# Patient Record
Sex: FEMALE | Race: BLACK OR AFRICAN AMERICAN | NOT HISPANIC OR LATINO | Employment: UNEMPLOYED | URBAN - METROPOLITAN AREA
[De-identification: names, ages, dates, MRNs, and addresses within clinical notes are randomized per-mention and may not be internally consistent; named-entity substitution may affect disease eponyms.]

---

## 2023-06-27 LAB
ALANINE AMINOTRANSFERASE (SGPT) (U/L) IN SER/PLAS: 9 U/L (ref 7–45)
ALBUMIN (G/DL) IN SER/PLAS: 4 G/DL (ref 3.4–5)
ALKALINE PHOSPHATASE (U/L) IN SER/PLAS: 78 U/L (ref 33–136)
ANION GAP IN SER/PLAS: 10 MMOL/L (ref 10–20)
ASPARTATE AMINOTRANSFERASE (SGOT) (U/L) IN SER/PLAS: 15 U/L (ref 9–39)
BASOPHILS (10*3/UL) IN BLOOD BY AUTOMATED COUNT: 0.03 X10E9/L (ref 0–0.1)
BASOPHILS/100 LEUKOCYTES IN BLOOD BY AUTOMATED COUNT: 0.5 % (ref 0–2)
BILIRUBIN DIRECT (MG/DL) IN SER/PLAS: 0.1 MG/DL (ref 0–0.3)
BILIRUBIN TOTAL (MG/DL) IN SER/PLAS: 0.6 MG/DL (ref 0–1.2)
CALCIUM (MG/DL) IN SER/PLAS: 9 MG/DL (ref 8.6–10.3)
CARBON DIOXIDE, TOTAL (MMOL/L) IN SER/PLAS: 27 MMOL/L (ref 21–32)
CHLORIDE (MMOL/L) IN SER/PLAS: 106 MMOL/L (ref 98–107)
CHOLESTEROL (MG/DL) IN SER/PLAS: 114 MG/DL (ref 0–199)
CHOLESTEROL IN HDL (MG/DL) IN SER/PLAS: 50.4 MG/DL
CHOLESTEROL/HDL RATIO: 2.3
CREATININE (MG/DL) IN SER/PLAS: 1.05 MG/DL (ref 0.5–1.05)
EOSINOPHILS (10*3/UL) IN BLOOD BY AUTOMATED COUNT: 0.07 X10E9/L (ref 0–0.7)
EOSINOPHILS/100 LEUKOCYTES IN BLOOD BY AUTOMATED COUNT: 1.2 % (ref 0–6)
ERYTHROCYTE DISTRIBUTION WIDTH (RATIO) BY AUTOMATED COUNT: 12.1 % (ref 11.5–14.5)
ERYTHROCYTE MEAN CORPUSCULAR HEMOGLOBIN CONCENTRATION (G/DL) BY AUTOMATED: 31.5 G/DL (ref 32–36)
ERYTHROCYTE MEAN CORPUSCULAR VOLUME (FL) BY AUTOMATED COUNT: 96 FL (ref 80–100)
ERYTHROCYTES (10*6/UL) IN BLOOD BY AUTOMATED COUNT: 4.72 X10E12/L (ref 4–5.2)
GFR FEMALE: 61 ML/MIN/1.73M2
GLUCOSE (MG/DL) IN SER/PLAS: 86 MG/DL (ref 74–99)
HEMATOCRIT (%) IN BLOOD BY AUTOMATED COUNT: 45.1 % (ref 36–46)
HEMOGLOBIN (G/DL) IN BLOOD: 14.2 G/DL (ref 12–16)
IMMATURE GRANULOCYTES/100 LEUKOCYTES IN BLOOD BY AUTOMATED COUNT: 0.2 % (ref 0–0.9)
LDL: 46 MG/DL (ref 0–99)
LEUKOCYTES (10*3/UL) IN BLOOD BY AUTOMATED COUNT: 5.7 X10E9/L (ref 4.4–11.3)
LYMPHOCYTES (10*3/UL) IN BLOOD BY AUTOMATED COUNT: 2.86 X10E9/L (ref 1.2–4.8)
LYMPHOCYTES/100 LEUKOCYTES IN BLOOD BY AUTOMATED COUNT: 50.4 % (ref 13–44)
MAGNESIUM (MG/DL) IN SER/PLAS: 2.1 MG/DL (ref 1.6–2.4)
MONOCYTES (10*3/UL) IN BLOOD BY AUTOMATED COUNT: 0.29 X10E9/L (ref 0.1–1)
MONOCYTES/100 LEUKOCYTES IN BLOOD BY AUTOMATED COUNT: 5.1 % (ref 2–10)
NEUTROPHILS (10*3/UL) IN BLOOD BY AUTOMATED COUNT: 2.42 X10E9/L (ref 1.2–7.7)
NEUTROPHILS/100 LEUKOCYTES IN BLOOD BY AUTOMATED COUNT: 42.6 % (ref 40–80)
PLATELETS (10*3/UL) IN BLOOD AUTOMATED COUNT: 211 X10E9/L (ref 150–450)
POTASSIUM (MMOL/L) IN SER/PLAS: 3.8 MMOL/L (ref 3.5–5.3)
PROTEIN TOTAL: 7 G/DL (ref 6.4–8.2)
SODIUM (MMOL/L) IN SER/PLAS: 139 MMOL/L (ref 136–145)
THYROTROPIN (MIU/L) IN SER/PLAS BY DETECTION LIMIT <= 0.05 MIU/L: 1.62 MIU/L (ref 0.44–3.98)
TRIGLYCERIDE (MG/DL) IN SER/PLAS: 87 MG/DL (ref 0–149)
UREA NITROGEN (MG/DL) IN SER/PLAS: 6 MG/DL (ref 6–23)
VLDL: 17 MG/DL (ref 0–40)

## 2023-06-28 LAB
ESTIMATED AVERAGE GLUCOSE FOR HBA1C: 108 MG/DL
HEMOGLOBIN A1C/HEMOGLOBIN TOTAL IN BLOOD: 5.4 %

## 2023-09-06 ENCOUNTER — HOSPITAL ENCOUNTER (OUTPATIENT)
Dept: DATA CONVERSION | Facility: HOSPITAL | Age: 58
Discharge: HOME | End: 2023-09-07
Payer: COMMERCIAL

## 2023-09-06 DIAGNOSIS — R07.9 CHEST PAIN, UNSPECIFIED: ICD-10-CM

## 2023-09-06 DIAGNOSIS — J20.9 ACUTE BRONCHITIS, UNSPECIFIED: ICD-10-CM

## 2023-09-06 DIAGNOSIS — R05.9 COUGH, UNSPECIFIED: ICD-10-CM

## 2023-09-06 DIAGNOSIS — R10.84 GENERALIZED ABDOMINAL PAIN: ICD-10-CM

## 2023-09-06 LAB
ANION GAP SERPL CALCULATED.3IONS-SCNC: 10 MMOL/L (ref 0–19)
BACTERIA SPEC CULT: NORMAL
BACTERIA UR QL AUTO: NEGATIVE
BASOPHILS # BLD AUTO: 0.02 K/UL (ref 0–0.22)
BASOPHILS NFR BLD AUTO: 0.3 % (ref 0–1)
BILIRUB UR QL STRIP.AUTO: NEGATIVE
BUN SERPL-MCNC: 9 MG/DL (ref 8–25)
BUN/CREAT SERPL: 9 RATIO (ref 8–21)
CALCIUM SERPL-MCNC: 9.1 MG/DL (ref 8.5–10.4)
CC # UR: NORMAL /UL
CHLORIDE SERPL-SCNC: 105 MMOL/L (ref 97–107)
CLARITY UR: CLEAR
CO2 SERPL-SCNC: 26 MMOL/L (ref 24–31)
COLOR UR: ABNORMAL
CREAT SERPL-MCNC: 1 MG/DL (ref 0.4–1.6)
DEPRECATED RDW RBC AUTO: 43 FL (ref 37–54)
DIFFERENTIAL METHOD BLD: ABNORMAL
EOSINOPHIL # BLD AUTO: 0.12 K/UL (ref 0–0.45)
EOSINOPHIL NFR BLD: 1.7 % (ref 0–3)
ERYTHROCYTE [DISTWIDTH] IN BLOOD BY AUTOMATED COUNT: 12.6 % (ref 11.7–15)
GFR SERPL CREATININE-BSD FRML MDRD: 64 ML/MIN/1.73 M2
GLUCOSE SERPL-MCNC: 109 MG/DL (ref 65–99)
GLUCOSE UR STRIP.AUTO-MCNC: NEGATIVE MG/DL
HCT VFR BLD AUTO: 42.9 % (ref 36–44)
HGB BLD-MCNC: 13.9 GM/DL (ref 12–15)
HGB UR QL STRIP.AUTO: ABNORMAL /HPF (ref 0–3)
HGB UR QL: NEGATIVE
HS TROPONIN T DELTA: NORMAL (ref 0–4)
HYALINE CASTS UR QL AUTO: 3 /LPF
IMM GRANULOCYTES # BLD AUTO: 0.02 K/UL (ref 0–0.1)
KETONES UR QL STRIP.AUTO: NEGATIVE
LEUKOCYTE ESTERASE UR QL STRIP.AUTO: ABNORMAL
LYMPHOCYTES # BLD AUTO: 3.73 K/UL (ref 1.2–3.2)
LYMPHOCYTES NFR BLD MANUAL: 52.5 % (ref 20–40)
MCH RBC QN AUTO: 30.1 PG (ref 26–34)
MCHC RBC AUTO-ENTMCNC: 32.4 % (ref 31–37)
MCV RBC AUTO: 92.9 FL (ref 80–100)
MICROSCOPIC (UA): ABNORMAL
MONOCYTES # BLD AUTO: 0.35 K/UL (ref 0–0.8)
MONOCYTES NFR BLD MANUAL: 4.9 % (ref 0–8)
NEUTROPHILS # BLD AUTO: 2.87 K/UL
NEUTROPHILS # BLD AUTO: 2.87 K/UL (ref 1.8–7.7)
NEUTROPHILS.IMMATURE NFR BLD: 0.3 % (ref 0–1)
NEUTS SEG NFR BLD: 40.3 % (ref 50–70)
NITRITE UR QL STRIP.AUTO: NEGATIVE
NRBC BLD-RTO: 0 /100 WBC
PH UR STRIP.AUTO: 5.5 [PH] (ref 4.6–8)
PLATELET # BLD AUTO: 199 K/UL (ref 150–450)
PMV BLD AUTO: 9.1 CU (ref 7–12.6)
POTASSIUM SERPL-SCNC: 3.9 MMOL/L (ref 3.4–5.1)
PROT UR STRIP.AUTO-MCNC: NEGATIVE MG/DL
RBC # BLD AUTO: 4.62 M/UL (ref 4–4.9)
REPORT STATUS -LH SQ DATA CONVERSION: NORMAL
SERVICE CMNT-IMP: NORMAL
SODIUM SERPL-SCNC: 141 MMOL/L (ref 133–145)
SP GR UR STRIP.AUTO: 1.01 (ref 1–1.03)
SPECIMEN SOURCE: NORMAL
SQUAMOUS UR QL AUTO: ABNORMAL /HPF
TROPONIN T SERPL-MCNC: 6 NG/L
URINE CULTURE: ABNORMAL
UROBILINOGEN UR QL STRIP.AUTO: NORMAL MG/DL (ref 0–1)
WBC # BLD AUTO: 7.1 K/UL (ref 4.5–11)
WBC #/AREA URNS AUTO: 6 /HPF (ref 0–3)

## 2023-09-07 LAB
ALBUMIN SERPL-MCNC: 3.6 GM/DL (ref 3.5–5)
ALBUMIN/GLOB SERPL: 1.5 RATIO (ref 1.5–3)
ALP BLD-CCNC: 71 U/L (ref 35–125)
ALT SERPL-CCNC: 7 U/L (ref 5–40)
AST SERPL-CCNC: 12 U/L (ref 5–40)
BILIRUB DIRECT SERPL-MCNC: 0.2 MG/DL (ref 0–0.2)
BILIRUB INDIRECT SERPL-MCNC: 0 MG/DL (ref 0–0.8)
BILIRUB SERPL-MCNC: 0.2 MG/DL (ref 0.1–1.2)
GLOBULIN SER-MCNC: 2.4 G/DL (ref 1.9–3.7)
HS TROPONIN T DELTA: 1 (ref 0–4)
PROT SERPL-MCNC: 6 G/DL (ref 5.9–7.9)
TROPONIN T SERPL-MCNC: 7 NG/L

## 2023-09-19 ENCOUNTER — HOSPITAL ENCOUNTER (OUTPATIENT)
Dept: DATA CONVERSION | Facility: HOSPITAL | Age: 58
Discharge: HOME | End: 2023-09-19
Payer: COMMERCIAL

## 2023-09-29 LAB
CHLAMYDIA TRACH., AMPLIFIED: NEGATIVE
N. GONORRHEA, AMPLIFIED: NEGATIVE
TRICHOMONAS VAGINALIS: NEGATIVE

## 2024-03-21 ENCOUNTER — OFFICE VISIT (OUTPATIENT)
Dept: OBSTETRICS AND GYNECOLOGY | Facility: CLINIC | Age: 59
End: 2024-03-21
Payer: COMMERCIAL

## 2024-03-21 VITALS
DIASTOLIC BLOOD PRESSURE: 68 MMHG | WEIGHT: 140 LBS | HEIGHT: 65 IN | BODY MASS INDEX: 23.32 KG/M2 | SYSTOLIC BLOOD PRESSURE: 128 MMHG

## 2024-03-21 DIAGNOSIS — N89.8 VAGINAL DISCHARGE: Primary | ICD-10-CM

## 2024-03-21 LAB
BACTERIAL VAGINOSIS VAG-IMP: NORMAL
CLUE CELLS VAG LPF-#/AREA: NORMAL /[LPF]
NUGENT SCORE: 4
YEAST VAG WET PREP-#/AREA: NORMAL

## 2024-03-21 PROCEDURE — 99214 OFFICE O/P EST MOD 30 MIN: CPT | Performed by: OBSTETRICS & GYNECOLOGY

## 2024-03-21 PROCEDURE — 87800 DETECT AGNT MULT DNA DIREC: CPT

## 2024-03-21 PROCEDURE — 87205 SMEAR GRAM STAIN: CPT

## 2024-03-21 RX ORDER — CETIRIZINE HYDROCHLORIDE 10 MG/1
1 TABLET ORAL DAILY
COMMUNITY
Start: 2018-03-28

## 2024-03-21 RX ORDER — LORATADINE 10 MG/1
10 TABLET ORAL DAILY
COMMUNITY
Start: 2023-12-06 | End: 2024-01-05 | Stop reason: WASHOUT

## 2024-03-21 RX ORDER — FLUTICASONE PROPIONATE 110 UG/1
AEROSOL, METERED RESPIRATORY (INHALATION)
COMMUNITY
Start: 2016-06-17

## 2024-03-21 RX ORDER — TIOTROPIUM BROMIDE INHALATION SPRAY 1.56 UG/1
2 SPRAY, METERED RESPIRATORY (INHALATION) DAILY
COMMUNITY
Start: 2019-04-30

## 2024-03-21 RX ORDER — TALC
POWDER (GRAM) TOPICAL
COMMUNITY

## 2024-03-21 RX ORDER — MONTELUKAST SODIUM 10 MG/1
1 TABLET ORAL NIGHTLY
COMMUNITY
Start: 2022-06-15

## 2024-03-21 RX ORDER — PANTOPRAZOLE SODIUM 40 MG/1
TABLET, DELAYED RELEASE ORAL
COMMUNITY
Start: 2019-12-01

## 2024-03-21 RX ORDER — ACETAMINOPHEN 500 MG
1 TABLET ORAL DAILY
COMMUNITY
Start: 2017-12-20

## 2024-03-21 RX ORDER — BUPROPION HYDROCHLORIDE 150 MG/1
TABLET ORAL
COMMUNITY
Start: 2019-10-10

## 2024-03-21 RX ORDER — AMLODIPINE BESYLATE 2.5 MG/1
TABLET ORAL
COMMUNITY
Start: 2024-03-17

## 2024-03-21 RX ORDER — NITROGLYCERIN 0.4 MG/1
TABLET SUBLINGUAL
COMMUNITY
Start: 2019-10-14

## 2024-03-21 RX ORDER — CITALOPRAM 10 MG/1
TABLET ORAL
COMMUNITY
Start: 2020-01-07

## 2024-03-21 RX ORDER — MULTIVITAMIN
1 TABLET ORAL DAILY
COMMUNITY
Start: 2024-03-09 | End: 2024-04-08

## 2024-03-21 RX ORDER — ALBUTEROL SULFATE 0.83 MG/ML
SOLUTION RESPIRATORY (INHALATION)
COMMUNITY

## 2024-03-21 RX ORDER — TRAZODONE HYDROCHLORIDE 50 MG/1
TABLET ORAL
COMMUNITY
Start: 2024-01-18

## 2024-03-21 RX ORDER — ALBUTEROL SULFATE 90 UG/1
1 AEROSOL, METERED RESPIRATORY (INHALATION) EVERY 4 HOURS PRN
COMMUNITY

## 2024-03-21 RX ORDER — VERAPAMIL HYDROCHLORIDE 180 MG/1
1 TABLET, FILM COATED, EXTENDED RELEASE ORAL DAILY
COMMUNITY
Start: 2017-12-19

## 2024-03-21 RX ORDER — FLUTICASONE PROPIONATE 50 MCG
2 SPRAY, SUSPENSION (ML) NASAL 2 TIMES DAILY
COMMUNITY
Start: 2017-04-11

## 2024-03-21 RX ORDER — ISOSORBIDE MONONITRATE 10 MG/1
TABLET ORAL
COMMUNITY

## 2024-03-21 RX ORDER — MAGNESIUM HYDROXIDE 400 MG/5ML
SUSPENSION, ORAL (FINAL DOSE FORM) ORAL
COMMUNITY

## 2024-03-21 RX ORDER — CLINDAMYCIN PHOSPHATE 1 G/10ML
GEL TOPICAL
COMMUNITY
Start: 2024-02-20 | End: 2024-04-24

## 2024-03-21 RX ORDER — ATORVASTATIN CALCIUM 40 MG/1
40 TABLET, FILM COATED ORAL DAILY
COMMUNITY
Start: 2024-02-17 | End: 2024-03-18 | Stop reason: WASHOUT

## 2024-03-21 RX ORDER — ESTRADIOL 0.1 MG/G
CREAM VAGINAL
COMMUNITY
Start: 2023-09-27

## 2024-03-21 RX ORDER — BUDESONIDE AND FORMOTEROL FUMARATE DIHYDRATE 160; 4.5 UG/1; UG/1
AEROSOL RESPIRATORY (INHALATION)
COMMUNITY

## 2024-03-21 RX ORDER — ASPIRIN 81 MG/1
1 TABLET ORAL DAILY
COMMUNITY
Start: 2017-09-13

## 2024-03-21 RX ORDER — FLUTICASONE FUROATE 27.5 UG/1
1 SPRAY, METERED NASAL DAILY
COMMUNITY
Start: 2019-04-30

## 2024-03-21 NOTE — PROGRESS NOTES
Kim Anguiano is a 59 y.o. female who presents with a chief complaint of Vaginal Discharge (Flow like discharge/No odor and no color )      SUBJECTIVE  Patient presents complaining of a vaginal discharge.  Has no odor or itching.  It is little more copious than she is used to.  She has not tried anything over-the-counter for it.    Past Medical History:   Diagnosis Date    Personal history of other diseases of the circulatory system     History of hypertension    Personal history of other diseases of the nervous system and sense organs     History of obstructive sleep apnea    Personal history of other diseases of the respiratory system     History of chronic obstructive lung disease    Personal history of other diseases of the respiratory system 2020    History of chronic obstructive lung disease    Personal history of other specified conditions     History of chest pain    Prediabetes     Pre-diabetes     Past Surgical History:   Procedure Laterality Date    OTHER SURGICAL HISTORY  2019    Cardiac catheterization    OTHER SURGICAL HISTORY  2019    Oral surgery    OTHER SURGICAL HISTORY  2019    Surgically induced      Social History     Socioeconomic History    Marital status: Single     Spouse name: None    Number of children: None    Years of education: None    Highest education level: None   Occupational History    None   Tobacco Use    Smoking status: Every Day     Types: Cigarettes    Smokeless tobacco: Never   Substance and Sexual Activity    Alcohol use: Yes    Drug use: Never    Sexual activity: None   Other Topics Concern    None   Social History Narrative    None     Social Determinants of Health     Financial Resource Strain: Not on file   Food Insecurity: Not on file   Transportation Needs: Not on file   Physical Activity: Not on file   Stress: Not on file   Social Connections: Not on file   Intimate Partner Violence: Not on file   Housing Stability: Not on file  "    No family history on file.    OB History   No obstetric history on file.       OBJECTIVE  Allergies   Allergen Reactions    Latex Hives and Rash     Was \"told by physician that she is allergic to latex\"/zohreh    Sulfa (Sulfonamide Antibiotics) Anaphylaxis      (Not in a hospital admission)       Review of Systems  History obtained from the patient  General ROS: negative  Psychological ROS: negative  Gastrointestinal ROS: negative  Musculoskeletal ROS: negative  Physical Exam  General Appearance: awake, alert, oriented, in no acute distress, well developed, well nourished, and in no acute distress  Skin: there are no suspicious lesions or rashes of concern, skin color, texture, turgor are normal; there are no bruises, rashes or lesions.  Head/Face: NCAT  Eyes: No gross abnormalities., PERRL, and EOMI  Neck: neck- supple, no mass, non-tender  Back: no pain to palpation  Abdomen: Soft, non-tender, normal bowel sounds; no bruits, organomegaly or masses.  Extremities: Extremities warm to touch, pink, with no edema.  Musculoskeletal: negative  Urogen: External genitalia: Normal, Vagina: Normal, Cervix: Normal, and Bimanual exam: Normal    /68   Ht 1.651 m (5' 5\")   Wt 63.5 kg (140 lb)   BMI 23.30 kg/m²    Problem List Items Addressed This Visit    None  Visit Diagnoses       Vaginal discharge    -  Primary    Relevant Orders    Vaginitis Gram Stain For Bacterial Vaginosis + Yeast    C. trachomatis + N. gonorrhoeae, Amplified               "

## 2024-03-22 ENCOUNTER — TELEPHONE (OUTPATIENT)
Dept: OBSTETRICS AND GYNECOLOGY | Facility: CLINIC | Age: 59
End: 2024-03-22
Payer: COMMERCIAL

## 2024-03-22 LAB
C TRACH RRNA SPEC QL NAA+PROBE: NEGATIVE
N GONORRHOEA DNA SPEC QL PROBE+SIG AMP: NEGATIVE

## 2024-04-09 ENCOUNTER — OFFICE VISIT (OUTPATIENT)
Dept: OBSTETRICS AND GYNECOLOGY | Facility: CLINIC | Age: 59
End: 2024-04-09
Payer: COMMERCIAL

## 2024-04-09 ENCOUNTER — HOSPITAL ENCOUNTER (OUTPATIENT)
Dept: RADIOLOGY | Facility: HOSPITAL | Age: 59
Discharge: HOME | End: 2024-04-09
Payer: COMMERCIAL

## 2024-04-09 ENCOUNTER — HOSPITAL ENCOUNTER (OUTPATIENT)
Dept: RADIOLOGY | Facility: CLINIC | Age: 59
End: 2024-04-09
Payer: COMMERCIAL

## 2024-04-09 VITALS
WEIGHT: 135.8 LBS | BODY MASS INDEX: 22.63 KG/M2 | DIASTOLIC BLOOD PRESSURE: 80 MMHG | SYSTOLIC BLOOD PRESSURE: 110 MMHG | HEIGHT: 65 IN

## 2024-04-09 DIAGNOSIS — N89.8 VAGINAL DISCHARGE: Primary | ICD-10-CM

## 2024-04-09 DIAGNOSIS — N89.8 VAGINAL DISCHARGE: ICD-10-CM

## 2024-04-09 PROCEDURE — 87205 SMEAR GRAM STAIN: CPT

## 2024-04-09 PROCEDURE — 76856 US EXAM PELVIC COMPLETE: CPT

## 2024-04-09 PROCEDURE — 99214 OFFICE O/P EST MOD 30 MIN: CPT | Performed by: OBSTETRICS & GYNECOLOGY

## 2024-04-09 NOTE — PROGRESS NOTES
Kim Anguiano is a 59 y.o. female who presents with a chief complaint of Vaginitis/Bacterial Vaginosis (Patient had cultures done in March for yeast , BV and std everything came back negative. Patient complains she is still having abnormal brown discharge and lower abdominal pain)      SUBJECTIVE  Patient presents complaining of continued vaginal discharge.  She was seen about a month ago and had BV and STDs all negative.  She states that still brown discharge and having lower abdominal pain.  She is having no bleeding.  We discussed this at length    Past Medical History:   Diagnosis Date    Personal history of other diseases of the circulatory system     History of hypertension    Personal history of other diseases of the nervous system and sense organs     History of obstructive sleep apnea    Personal history of other diseases of the respiratory system     History of chronic obstructive lung disease    Personal history of other diseases of the respiratory system 2020    History of chronic obstructive lung disease    Personal history of other specified conditions     History of chest pain    Prediabetes     Pre-diabetes     Past Surgical History:   Procedure Laterality Date    OTHER SURGICAL HISTORY  2019    Cardiac catheterization    OTHER SURGICAL HISTORY  2019    Oral surgery    OTHER SURGICAL HISTORY  2019    Surgically induced      Social History     Socioeconomic History    Marital status: Single     Spouse name: None    Number of children: None    Years of education: None    Highest education level: None   Occupational History    None   Tobacco Use    Smoking status: Every Day     Types: Cigarettes    Smokeless tobacco: Never   Vaping Use    Vaping Use: Never used   Substance and Sexual Activity    Alcohol use: Yes    Drug use: Never    Sexual activity: Yes   Other Topics Concern    None   Social History Narrative    None     Social Determinants of Health     Financial  "Resource Strain: Not on file   Food Insecurity: Not on file   Transportation Needs: Not on file   Physical Activity: Not on file   Stress: Not on file   Social Connections: Not on file   Intimate Partner Violence: Not on file   Housing Stability: Not on file     No family history on file.    OB History   No obstetric history on file.       OBJECTIVE  Allergies   Allergen Reactions    Latex Hives and Rash     Was \"told by physician that she is allergic to latex\"/jmm    Sulfa (Sulfonamide Antibiotics) Anaphylaxis      (Not in a hospital admission)       Review of Systems  History obtained from the patient  General ROS: negative  Psychological ROS: negative  Gastrointestinal ROS: negative  Musculoskeletal ROS: negative  Physical Exam  General Appearance: awake, alert, oriented, in no acute distress, well developed, well nourished, and in no acute distress  Skin: there are no suspicious lesions or rashes of concern, skin color, texture, turgor are normal; there are no bruises, rashes or lesions.  Head/Face: NCAT  Eyes: No gross abnormalities., PERRL, and EOMI  Neck: neck- supple, no mass, non-tender  Back: no pain to palpation  Abdomen: Soft, non-tender, normal bowel sounds; no bruits, organomegaly or masses.  Extremities: Extremities warm to touch, pink, with no edema.  Musculoskeletal: negative  Urogen: External genitalia: Normal, Vagina: Normal, Cervix: Normal, and Bimanual exam: Normal    /80   Ht 1.651 m (5' 5\")   Wt 61.6 kg (135 lb 12.8 oz)   BMI 22.60 kg/m²    Problem List Items Addressed This Visit    None  Visit Diagnoses       Vaginal discharge    -  Primary    Relevant Orders    Vaginitis Gram Stain For Bacterial Vaginosis + Yeast    US PELVIS TRANSABDOMINAL WITH TRANSVAGINAL         Follow up after ultrasound          "

## 2024-04-23 DIAGNOSIS — R22.9 LOCALIZED SWELLING, MASS AND LUMP, UNSPECIFIED: ICD-10-CM

## 2024-04-24 RX ORDER — CLINDAMYCIN PHOSPHATE 1 G/10ML
GEL TOPICAL
Qty: 75 ML | Refills: 3 | Status: SHIPPED | OUTPATIENT
Start: 2024-04-24

## 2024-08-06 DIAGNOSIS — R63.4 ABNORMAL WEIGHT LOSS: Primary | ICD-10-CM

## 2024-08-16 ENCOUNTER — APPOINTMENT (OUTPATIENT)
Dept: RADIOLOGY | Facility: HOSPITAL | Age: 59
End: 2024-08-16
Payer: COMMERCIAL

## 2024-08-16 ENCOUNTER — LAB (OUTPATIENT)
Dept: LAB | Facility: LAB | Age: 59
End: 2024-08-16
Payer: COMMERCIAL

## 2024-08-16 ENCOUNTER — HOSPITAL ENCOUNTER (OUTPATIENT)
Dept: RADIOLOGY | Facility: HOSPITAL | Age: 59
Discharge: HOME | End: 2024-08-16
Payer: COMMERCIAL

## 2024-08-16 DIAGNOSIS — Z12.31 ENCOUNTER FOR SCREENING MAMMOGRAM FOR MALIGNANT NEOPLASM OF BREAST: ICD-10-CM

## 2024-08-16 DIAGNOSIS — R63.4 ABNORMAL WEIGHT LOSS: Primary | ICD-10-CM

## 2024-08-16 DIAGNOSIS — J44.9 CHRONIC OBSTRUCTIVE PULMONARY DISEASE, UNSPECIFIED (MULTI): ICD-10-CM

## 2024-08-16 LAB
ALBUMIN SERPL-MCNC: 4.1 G/DL (ref 3.5–5)
ALP BLD-CCNC: 87 U/L (ref 35–125)
ALT SERPL-CCNC: 12 U/L (ref 5–40)
ANION GAP SERPL CALC-SCNC: 9 MMOL/L
AST SERPL-CCNC: 16 U/L (ref 5–40)
BILIRUB SERPL-MCNC: 0.4 MG/DL (ref 0.1–1.2)
BUN SERPL-MCNC: 10 MG/DL (ref 8–25)
CALCIUM SERPL-MCNC: 8.8 MG/DL (ref 8.5–10.4)
CHLORIDE SERPL-SCNC: 108 MMOL/L (ref 97–107)
CHOLEST SERPL-MCNC: 124 MG/DL (ref 133–200)
CHOLEST/HDLC SERPL: 2 {RATIO}
CO2 SERPL-SCNC: 28 MMOL/L (ref 24–31)
CREAT SERPL-MCNC: 1.3 MG/DL (ref 0.4–1.6)
CRP SERPL-MCNC: <0.3 MG/DL (ref 0–2)
EGFRCR SERPLBLD CKD-EPI 2021: 47 ML/MIN/1.73M*2
ERYTHROCYTE [DISTWIDTH] IN BLOOD BY AUTOMATED COUNT: 12.9 % (ref 11.5–14.5)
ERYTHROCYTE [SEDIMENTATION RATE] IN BLOOD BY WESTERGREN METHOD: 14 MM/H (ref 0–30)
GLUCOSE SERPL-MCNC: 81 MG/DL (ref 65–99)
HCT VFR BLD AUTO: 44.3 % (ref 36–46)
HDLC SERPL-MCNC: 62 MG/DL
HGB BLD-MCNC: 14.1 G/DL (ref 12–16)
LDLC SERPL CALC-MCNC: 38 MG/DL (ref 65–130)
MCH RBC QN AUTO: 30.7 PG (ref 26–34)
MCHC RBC AUTO-ENTMCNC: 31.8 G/DL (ref 32–36)
MCV RBC AUTO: 96 FL (ref 80–100)
MUCOUS THREADS #/AREA URNS AUTO: NORMAL /LPF
NRBC BLD-RTO: 0 /100 WBCS (ref 0–0)
PLATELET # BLD AUTO: 185 X10*3/UL (ref 150–450)
POTASSIUM SERPL-SCNC: 4.4 MMOL/L (ref 3.4–5.1)
PROT SERPL-MCNC: 6.2 G/DL (ref 5.9–7.9)
RBC # BLD AUTO: 4.6 X10*6/UL (ref 4–5.2)
RBC #/AREA URNS AUTO: NORMAL /HPF
SODIUM SERPL-SCNC: 145 MMOL/L (ref 133–145)
TRIGL SERPL-MCNC: 118 MG/DL (ref 40–150)
TSH SERPL DL<=0.05 MIU/L-ACNC: 1.03 MIU/L (ref 0.27–4.2)
WBC # BLD AUTO: 5.8 X10*3/UL (ref 4.4–11.3)
WBC #/AREA URNS AUTO: NORMAL /HPF

## 2024-08-16 PROCEDURE — 84443 ASSAY THYROID STIM HORMONE: CPT

## 2024-08-16 PROCEDURE — 36415 COLL VENOUS BLD VENIPUNCTURE: CPT

## 2024-08-16 PROCEDURE — 80061 LIPID PANEL: CPT

## 2024-08-16 PROCEDURE — 86140 C-REACTIVE PROTEIN: CPT

## 2024-08-16 PROCEDURE — 85027 COMPLETE CBC AUTOMATED: CPT

## 2024-08-16 PROCEDURE — 85652 RBC SED RATE AUTOMATED: CPT

## 2024-08-16 PROCEDURE — 77067 SCR MAMMO BI INCL CAD: CPT

## 2024-08-16 PROCEDURE — 81001 URINALYSIS AUTO W/SCOPE: CPT

## 2024-08-16 PROCEDURE — 80053 COMPREHEN METABOLIC PANEL: CPT

## 2024-08-23 ENCOUNTER — APPOINTMENT (OUTPATIENT)
Dept: RADIOLOGY | Facility: HOSPITAL | Age: 59
End: 2024-08-23
Payer: COMMERCIAL

## 2024-09-03 ENCOUNTER — HOSPITAL ENCOUNTER (OUTPATIENT)
Dept: RADIOLOGY | Facility: CLINIC | Age: 59
End: 2024-09-03
Payer: COMMERCIAL

## 2024-09-03 ENCOUNTER — APPOINTMENT (OUTPATIENT)
Dept: RADIOLOGY | Facility: CLINIC | Age: 59
End: 2024-09-03
Payer: COMMERCIAL

## 2024-09-28 ENCOUNTER — APPOINTMENT (OUTPATIENT)
Dept: RADIOLOGY | Facility: HOSPITAL | Age: 59
End: 2024-09-28
Payer: COMMERCIAL

## 2024-09-28 ENCOUNTER — APPOINTMENT (OUTPATIENT)
Dept: CARDIOLOGY | Facility: HOSPITAL | Age: 59
End: 2024-09-28
Payer: COMMERCIAL

## 2024-09-28 ENCOUNTER — HOSPITAL ENCOUNTER (EMERGENCY)
Facility: HOSPITAL | Age: 59
Discharge: HOME | End: 2024-09-28
Attending: STUDENT IN AN ORGANIZED HEALTH CARE EDUCATION/TRAINING PROGRAM
Payer: COMMERCIAL

## 2024-09-28 VITALS
SYSTOLIC BLOOD PRESSURE: 138 MMHG | RESPIRATION RATE: 18 BRPM | TEMPERATURE: 97.3 F | DIASTOLIC BLOOD PRESSURE: 74 MMHG | HEIGHT: 65 IN | WEIGHT: 137 LBS | BODY MASS INDEX: 22.82 KG/M2 | HEART RATE: 68 BPM | OXYGEN SATURATION: 97 %

## 2024-09-28 DIAGNOSIS — B34.9 VIRAL ILLNESS: ICD-10-CM

## 2024-09-28 DIAGNOSIS — J44.1 COPD EXACERBATION (MULTI): Primary | ICD-10-CM

## 2024-09-28 LAB
ALBUMIN SERPL BCP-MCNC: 4.2 G/DL (ref 3.4–5)
ALP SERPL-CCNC: 62 U/L (ref 33–136)
ALT SERPL W P-5'-P-CCNC: 7 U/L (ref 7–45)
ANION GAP SERPL CALCULATED.3IONS-SCNC: 11 MMOL/L (ref 10–20)
AST SERPL W P-5'-P-CCNC: 15 U/L (ref 9–39)
BASE EXCESS BLDV CALC-SCNC: 1.7 MMOL/L (ref -2–3)
BASOPHILS # BLD AUTO: 0.02 X10*3/UL (ref 0–0.1)
BASOPHILS NFR BLD AUTO: 0.4 %
BILIRUB SERPL-MCNC: 0.7 MG/DL (ref 0–1.2)
BODY TEMPERATURE: 37 DEGREES CELSIUS
BUN SERPL-MCNC: 8 MG/DL (ref 6–23)
CALCIUM SERPL-MCNC: 8.9 MG/DL (ref 8.6–10.3)
CARDIAC TROPONIN I PNL SERPL HS: <3 NG/L (ref 0–13)
CARDIAC TROPONIN I PNL SERPL HS: <3 NG/L (ref 0–13)
CHLORIDE SERPL-SCNC: 107 MMOL/L (ref 98–107)
CO2 SERPL-SCNC: 26 MMOL/L (ref 21–32)
CREAT SERPL-MCNC: 0.98 MG/DL (ref 0.5–1.05)
EGFRCR SERPLBLD CKD-EPI 2021: 66 ML/MIN/1.73M*2
EOSINOPHIL # BLD AUTO: 0.08 X10*3/UL (ref 0–0.7)
EOSINOPHIL NFR BLD AUTO: 1.6 %
ERYTHROCYTE [DISTWIDTH] IN BLOOD BY AUTOMATED COUNT: 12.4 % (ref 11.5–14.5)
GLUCOSE SERPL-MCNC: 94 MG/DL (ref 74–99)
HCO3 BLDV-SCNC: 25.8 MMOL/L (ref 22–26)
HCT VFR BLD AUTO: 45.9 % (ref 36–46)
HGB BLD-MCNC: 15.2 G/DL (ref 12–16)
IMM GRANULOCYTES # BLD AUTO: 0.01 X10*3/UL (ref 0–0.7)
IMM GRANULOCYTES NFR BLD AUTO: 0.2 % (ref 0–0.9)
INHALED O2 CONCENTRATION: 21 %
LYMPHOCYTES # BLD AUTO: 1.49 X10*3/UL (ref 1.2–4.8)
LYMPHOCYTES NFR BLD AUTO: 30.5 %
MCH RBC QN AUTO: 30.7 PG (ref 26–34)
MCHC RBC AUTO-ENTMCNC: 33.1 G/DL (ref 32–36)
MCV RBC AUTO: 93 FL (ref 80–100)
MONOCYTES # BLD AUTO: 0.44 X10*3/UL (ref 0.1–1)
MONOCYTES NFR BLD AUTO: 9 %
NEUTROPHILS # BLD AUTO: 2.85 X10*3/UL (ref 1.2–7.7)
NEUTROPHILS NFR BLD AUTO: 58.3 %
NRBC BLD-RTO: 0 /100 WBCS (ref 0–0)
OXYHGB MFR BLDV: 85.9 % (ref 45–75)
PCO2 BLDV: 38 MM HG (ref 41–51)
PH BLDV: 7.44 PH (ref 7.33–7.43)
PLATELET # BLD AUTO: 166 X10*3/UL (ref 150–450)
PO2 BLDV: 56 MM HG (ref 35–45)
POTASSIUM SERPL-SCNC: 3.8 MMOL/L (ref 3.5–5.3)
PROT SERPL-MCNC: 7.3 G/DL (ref 6.4–8.2)
RBC # BLD AUTO: 4.95 X10*6/UL (ref 4–5.2)
RSV RNA RESP QL NAA+PROBE: NOT DETECTED
SAO2 % BLDV: 89 % (ref 45–75)
SARS-COV-2 RNA RESP QL NAA+PROBE: NOT DETECTED
SODIUM SERPL-SCNC: 140 MMOL/L (ref 136–145)
WBC # BLD AUTO: 4.9 X10*3/UL (ref 4.4–11.3)

## 2024-09-28 PROCEDURE — 87635 SARS-COV-2 COVID-19 AMP PRB: CPT

## 2024-09-28 PROCEDURE — 85025 COMPLETE CBC W/AUTO DIFF WBC: CPT | Performed by: STUDENT IN AN ORGANIZED HEALTH CARE EDUCATION/TRAINING PROGRAM

## 2024-09-28 PROCEDURE — 99284 EMERGENCY DEPT VISIT MOD MDM: CPT | Mod: 25

## 2024-09-28 PROCEDURE — 2500000001 HC RX 250 WO HCPCS SELF ADMINISTERED DRUGS (ALT 637 FOR MEDICARE OP): Performed by: STUDENT IN AN ORGANIZED HEALTH CARE EDUCATION/TRAINING PROGRAM

## 2024-09-28 PROCEDURE — 93005 ELECTROCARDIOGRAM TRACING: CPT

## 2024-09-28 PROCEDURE — 82810 BLOOD GASES O2 SAT ONLY: CPT | Mod: CCI | Performed by: STUDENT IN AN ORGANIZED HEALTH CARE EDUCATION/TRAINING PROGRAM

## 2024-09-28 PROCEDURE — 71045 X-RAY EXAM CHEST 1 VIEW: CPT

## 2024-09-28 PROCEDURE — 2500000002 HC RX 250 W HCPCS SELF ADMINISTERED DRUGS (ALT 637 FOR MEDICARE OP, ALT 636 FOR OP/ED)

## 2024-09-28 PROCEDURE — 84484 ASSAY OF TROPONIN QUANT: CPT | Performed by: STUDENT IN AN ORGANIZED HEALTH CARE EDUCATION/TRAINING PROGRAM

## 2024-09-28 PROCEDURE — 36415 COLL VENOUS BLD VENIPUNCTURE: CPT | Performed by: STUDENT IN AN ORGANIZED HEALTH CARE EDUCATION/TRAINING PROGRAM

## 2024-09-28 PROCEDURE — 2500000004 HC RX 250 GENERAL PHARMACY W/ HCPCS (ALT 636 FOR OP/ED)

## 2024-09-28 PROCEDURE — 80053 COMPREHEN METABOLIC PANEL: CPT | Performed by: STUDENT IN AN ORGANIZED HEALTH CARE EDUCATION/TRAINING PROGRAM

## 2024-09-28 PROCEDURE — 96374 THER/PROPH/DIAG INJ IV PUSH: CPT

## 2024-09-28 PROCEDURE — 87634 RSV DNA/RNA AMP PROBE: CPT

## 2024-09-28 PROCEDURE — 71045 X-RAY EXAM CHEST 1 VIEW: CPT | Performed by: RADIOLOGY

## 2024-09-28 PROCEDURE — 94640 AIRWAY INHALATION TREATMENT: CPT

## 2024-09-28 RX ORDER — PREDNISONE 50 MG/1
50 TABLET ORAL DAILY
Qty: 3 TABLET | Refills: 0 | Status: SHIPPED | OUTPATIENT
Start: 2024-09-28 | End: 2024-09-28

## 2024-09-28 RX ORDER — PREDNISONE 50 MG/1
50 TABLET ORAL DAILY
Qty: 3 TABLET | Refills: 0 | Status: SHIPPED | OUTPATIENT
Start: 2024-09-28 | End: 2024-10-01

## 2024-09-28 RX ORDER — IPRATROPIUM BROMIDE AND ALBUTEROL SULFATE 2.5; .5 MG/3ML; MG/3ML
3 SOLUTION RESPIRATORY (INHALATION)
Status: DISCONTINUED | OUTPATIENT
Start: 2024-09-28 | End: 2024-09-28 | Stop reason: HOSPADM

## 2024-09-28 RX ORDER — ACETAMINOPHEN 500 MG
1000 TABLET ORAL ONCE
Status: COMPLETED | OUTPATIENT
Start: 2024-09-28 | End: 2024-09-28

## 2024-09-28 RX ADMIN — METHYLPREDNISOLONE SODIUM SUCCINATE 125 MG: 125 INJECTION, POWDER, FOR SOLUTION INTRAMUSCULAR; INTRAVENOUS at 14:47

## 2024-09-28 RX ADMIN — IPRATROPIUM BROMIDE AND ALBUTEROL SULFATE 3 ML: 2.5; .5 SOLUTION RESPIRATORY (INHALATION) at 14:47

## 2024-09-28 RX ADMIN — ACETAMINOPHEN 1000 MG: 500 TABLET ORAL at 17:07

## 2024-09-28 ASSESSMENT — PAIN SCALES - GENERAL: PAINLEVEL_OUTOF10: 8

## 2024-09-28 ASSESSMENT — PAIN - FUNCTIONAL ASSESSMENT: PAIN_FUNCTIONAL_ASSESSMENT: 0-10

## 2024-09-28 ASSESSMENT — COLUMBIA-SUICIDE SEVERITY RATING SCALE - C-SSRS
6. HAVE YOU EVER DONE ANYTHING, STARTED TO DO ANYTHING, OR PREPARED TO DO ANYTHING TO END YOUR LIFE?: NO
1. IN THE PAST MONTH, HAVE YOU WISHED YOU WERE DEAD OR WISHED YOU COULD GO TO SLEEP AND NOT WAKE UP?: NO
2. HAVE YOU ACTUALLY HAD ANY THOUGHTS OF KILLING YOURSELF?: NO

## 2024-09-28 ASSESSMENT — PAIN DESCRIPTION - LOCATION: LOCATION: GENERALIZED

## 2024-09-28 NOTE — ED TRIAGE NOTES
Pt states she has been having shortness of breath, chest pain and coughing up blood since Wednesday. Pt self tested for covid at home which was negative. Pt states she has been very weak.

## 2024-09-28 NOTE — DISCHARGE INSTRUCTIONS
please take prednisone 50 mg for 3 days for treatment of COPD exacerbation. Continue with symptomatic care at home.Follow-up with your primary care provider for reevaluation of symptoms    It is important to remember that your care does not end here and you must continue to monitor your condition closely. Please return to the emergency department for any worsening or concerning signs or symptoms as directed by our conversations and the discharge instructions. If you do not have a doctor please contact the referral number on your discharge instructions. Please contact any physician specialists provided in your discharge notes as it is very important to follow up with them regarding your condition. If you are unable to reach the physicians provided, please come back to the Emergency Department at any time.

## 2024-09-28 NOTE — ED PROVIDER NOTES
HPI   Chief Complaint   Patient presents with    Chest Pain       HPI  Patient is a 59-year-old female with history of COPD presenting for evaluation of chest pain and shortness of breath worsening over the past 3 days.  Patient states that she has felt somewhat chilled as well.  She states that she has had increase in her cough recently and has also been coughing up streaks of blood.  She states she has felt increasingly weak despite symptomatic treatment at home.  Otherwise denies abdominal pain, nausea, vomiting.  Denies urinary symptoms.  Denies headache or visual symptoms.  She states that she has been using her albuterol inhaler but has not been able to use her nebulizer machine.      Patient History   Past Medical History:   Diagnosis Date    Personal history of other diseases of the circulatory system     History of hypertension    Personal history of other diseases of the nervous system and sense organs     History of obstructive sleep apnea    Personal history of other diseases of the respiratory system     History of chronic obstructive lung disease    Personal history of other diseases of the respiratory system 2020    History of chronic obstructive lung disease    Personal history of other specified conditions     History of chest pain    Prediabetes     Pre-diabetes     Past Surgical History:   Procedure Laterality Date    OTHER SURGICAL HISTORY  2019    Cardiac catheterization    OTHER SURGICAL HISTORY  2019    Oral surgery    OTHER SURGICAL HISTORY  2019    Surgically induced      No family history on file.  Social History     Tobacco Use    Smoking status: Every Day     Types: Cigarettes    Smokeless tobacco: Never   Vaping Use    Vaping status: Never Used   Substance Use Topics    Alcohol use: Yes    Drug use: Never       Physical Exam   ED Triage Vitals [24 1331]   Temperature Heart Rate Respirations BP   36.3 °C (97.3 °F) 70 20 --      Pulse Ox Temp src Heart  Rate Source Patient Position   (!) 90 % -- -- --      BP Location FiO2 (%)     -- --       Physical Exam  Vitals and nursing note reviewed.   Constitutional:       General: She is not in acute distress.     Appearance: She is well-developed.      Comments: This is a well-appearing 59-year-old female resting in hospital bed in no apparent distress   HENT:      Head: Normocephalic and atraumatic.   Eyes:      Conjunctiva/sclera: Conjunctivae normal.   Cardiovascular:      Rate and Rhythm: Normal rate and regular rhythm.      Heart sounds: No murmur heard.  Pulmonary:      Effort: Pulmonary effort is normal. No respiratory distress.      Breath sounds: Normal breath sounds.      Comments: Lungs slightly diminished but otherwise clear to auscultation bilaterally  Abdominal:      Palpations: Abdomen is soft.      Tenderness: There is no abdominal tenderness.   Musculoskeletal:         General: No swelling.      Cervical back: Neck supple.      Right lower leg: No edema.      Left lower leg: No edema.   Skin:     General: Skin is warm and dry.      Capillary Refill: Capillary refill takes less than 2 seconds.   Neurological:      Mental Status: She is alert.   Psychiatric:         Mood and Affect: Mood normal.           ED Course & MDM   ED Course as of 09/30/24 2148   Sat Sep 28, 2024   1432 I personally reviewed and interpreted the EKG @1419: NSR 68, normal intervals, no appreciable ischemia, LAD, LAFB, prior EKG on 5/8/2022 reviewed without any appreciable specific/identifiable changes, and low voltage criteria QS involving limb leads. [BC]      ED Course User Index  [BC] Beau Parish MD         Diagnoses as of 09/30/24 2148   COPD exacerbation (Multi)   Viral illness                 No data recorded     Jania Coma Scale Score: 15 (09/28/24 1332 : Kayla Cordero LPN)                           Medical Decision Making  Parts of this chart have been completed using voice recognition software. Please excuse any  errors of transcription.  My thought process and reason for plan has been formulated from the time that I saw the patient until the time of disposition and is not specific to one specific moment during their visit and furthermore my MDM encompasses this entire chart and not only this text box.      HPI: Detailed above.    Exam: A medically appropriate exam performed, outlined above, given the known history and presentation.    History obtained from: Patient    EKG: Interpreted by attending physician and reviewed by me    Social Determinants of Health considered during this visit: Lives independently    Medications given during visit:  Medications   methylPREDNISolone sod succinate (SOLU-Medrol) injection 125 mg (125 mg intravenous Given 9/28/24 1447)   acetaminophen (Tylenol) tablet 1,000 mg (1,000 mg oral Given 9/28/24 1707)        Diagnostic/tests  Labs Reviewed   BLOOD GAS VENOUS - Abnormal       Result Value    POCT pH, Venous 7.44 (*)     POCT pCO2, Venous 38 (*)     POCT pO2, Venous 56 (*)     POCT SO2, Venous 89 (*)     POCT Oxy Hemoglobin, Venous 85.9 (*)     POCT Base Excess, Venous 1.7      POCT HCO3 Calculated, Venous 25.8      Patient Temperature 37.0      FiO2 21     COMPREHENSIVE METABOLIC PANEL - Normal    Glucose 94      Sodium 140      Potassium 3.8      Chloride 107      Bicarbonate 26      Anion Gap 11      Urea Nitrogen 8      Creatinine 0.98      eGFR 66      Calcium 8.9      Albumin 4.2      Alkaline Phosphatase 62      Total Protein 7.3      AST 15      Bilirubin, Total 0.7      ALT 7     SERIAL TROPONIN-INITIAL - Normal    Troponin I, High Sensitivity <3      Narrative:     Less than 99th percentile of normal range cutoff-  Female and children under 18 years old <14 ng/L; Male <21 ng/L: Negative  Repeat testing should be performed if clinically indicated.     Female and children under 18 years old 14-50 ng/L; Male 21-50 ng/L:  Consistent with possible cardiac damage and possible increased  clinical   risk. Serial measurements may help to assess extent of myocardial damage.     >50 ng/L: Consistent with cardiac damage, increased clinical risk and  myocardial infarction. Serial measurements may help assess extent of   myocardial damage.      NOTE: Children less than 1 year old may have higher baseline troponin   levels and results should be interpreted in conjunction with the overall   clinical context.     NOTE: Troponin I testing is performed using a different   testing methodology at [facility] than at other   [facility]. Direct result comparisons should only   be made within the same method.   SERIAL TROPONIN, 1 HOUR - Normal    Troponin I, High Sensitivity <3      Narrative:     Less than 99th percentile of normal range cutoff-  Female and children under 18 years old <14 ng/L; Male <21 ng/L: Negative  Repeat testing should be performed if clinically indicated.     Female and children under 18 years old 14-50 ng/L; Male 21-50 ng/L:  Consistent with possible cardiac damage and possible increased clinical   risk. Serial measurements may help to assess extent of myocardial damage.     >50 ng/L: Consistent with cardiac damage, increased clinical risk and  myocardial infarction. Serial measurements may help assess extent of   myocardial damage.      NOTE: Children less than 1 year old may have higher baseline troponin   levels and results should be interpreted in conjunction with the overall   clinical context.     NOTE: Troponin I testing is performed using a different   testing methodology at [facility] than at other   [facility]. Direct result comparisons should only   be made within the same method.   RSV PCR - Normal    RSV PCR Not Detected      Narrative:     This assay is an FDA-cleared, in vitro diagnostic nucleic acid amplification test for the detection of RSV from nasopharyngeal specimens, and has been validated for use at [facility]  System. Negative results do not preclude RSV infections, and should not be used as the sole basis for diagnosis, treatment, or other management decisions. If Influenza A/B and RSV PCR results are negative, testing for Parainfluenza virus, Adenovirus and Metapneumovirus is routinely performed for pediatric oncology and intensive care [place] [facility] [place] and is available on other patients by placing an add-on request.       SARS-COV-2 PCR - Normal    Coronavirus 2019, PCR Not Detected      Narrative:     This assay has received FDA Emergency Use Authorization (EUA) and is only authorized for the duration of time that circumstances exist to justify the authorization of the emergency use of in vitro diagnostic tests for the detection of SARS-CoV-2 virus and/or diagnosis of COVID-19 infection under section 564(b)(1) of the Act, 21 U.S.C. 360bbb-3(b)(1). This assay is an in vitro diagnostic nucleic acid amplification test for the qualitative detection of SARS-CoV-2 from nasopharyngeal specimens and has been validated for use at [facility]. Negative results do not preclude COVID-19 infections and should not be used as the sole basis for diagnosis, treatment, or other management decisions.     CBC WITH AUTO DIFFERENTIAL    WBC 4.9      nRBC 0.0      RBC 4.95      Hemoglobin 15.2      Hematocrit 45.9      MCV 93      MCH 30.7      MCHC 33.1      RDW 12.4      Platelets 166      Neutrophils % 58.3      Immature Granulocytes %, Automated 0.2      Lymphocytes % 30.5      Monocytes % 9.0      Eosinophils % 1.6      Basophils % 0.4      Neutrophils Absolute 2.85      Immature Granulocytes Absolute, Automated 0.01      Lymphocytes Absolute 1.49      Monocytes Absolute 0.44      Eosinophils Absolute 0.08      Basophils Absolute 0.02     TROPONIN SERIES- (INITIAL, 1 HR)    Narrative:     The following orders were created for panel order Troponin I Series, High Sensitivity (0, 1 HR).  Procedure                                Abnormality         Status                     ---------                               -----------         ------                     Troponin I, High Sensiti...[[id number]]  Normal              Final result               Troponin, High Sensitivi...[[id number]]  Normal              Final result                 Please view results for these tests on the individual orders.      XR chest 1 view   Final Result   No acute cardiopulmonary abnormality.        Signed by: Silvia Rodriguez 9/28/2024 2:35 PM   Dictation workstation:   [id number]           Considerations/further MDM:  Patient is a 59-year-old female with history of COPD presenting for evaluation of chest pain, shortness of breath    Differential diagnosis associated with the patient presentation includes: Pneumonia versus COPD exacerbation versus ACS versus pneumothorax versus bronchitis versus viral illness    Patient is well-appearing awake and alert communicating history in no apparent distress during the visit.  She has no evidence of airway compromise or respiratory distress.  She is hemodynamically stable not requiring oxygen on room air.  She was provided DuoNeb, Solu-Medrol and Tylenol for symptomatic treatment.  VBG was obtained without evidence of acidosis.  EKG was performed without evidence of acute ischemia.  Laboratory workup was unremarkable without evidence of leukocytosis, anemia or electrolyte abnormality.  Viral studies negative.  Cardiac enzymes are negative upon repeat.  Chest x-ray is without evidence of pneumonia, pneumothorax or findings consistent with CHF.  Patient remains well-appearing on room air.  Symptoms likely consistent with mild COPD exacerbation versus viral illness.  Patient provided short course of prednisone and instructed to continue with albuterol for treatment.  Return precautions discussed.  Instructed to follow-up with primary care in 24 to 48 hours for reevaluation.  Patient is agreeable with this plan  of care.  I saw this patient in conjunction with Dr. Parish. I discussed the laboratory and imaging findings with the patient at bedside. Patient's questions and concerns were addressed. Patient was released in good condition, discharged with instructions to follow up with primary care provider and appropriate specialist, and to return to ED at any time for worsening symptoms or any other concerns. Patient demonstrates understanding of the findings and the importance of appropriate follow up care.         Procedure  Procedures     Carolyn Zepeda PA-C  09/30/24 2147

## 2024-09-28 NOTE — ED PROVIDER NOTES
"The patient was seen in conjunction with the advanced practice provider, and I performed a substantive portion of the encounter. The following is my supervisory note.    History:  Patient presents to ED for cough and generalized fatigue/weakness for the last few days also notes recent COVID exposure to known sick visuals.  Notes she has been coughing up primarily bright red blood.  Notes associated posttussive chest pain.  Not experiencing any significant unexpected weight gain or bilateral/unilateral leg swelling.  Notes history of COPD.  Still smoking 0.5 PPD.  Experiences generalized weakness without any significant lightheadedness/dizziness.  Notes subjective fever symptoms and mild chills.  Notes minimal nonbloody diarrhea.  Tolerating hydration but mild loss of appetite.  Denies any history of DVT/PE and does not endorse any concerning history/Red flags for DVT/PE.  States been taking all of her medications appropriately.  States has been using her nebulizer machine at home with increased frequency with minimal to no relief.    VS:  /74   Pulse 68   Temp 36.3 °C (97.3 °F)   Resp 18   Ht 1.651 m (5' 5\")   Wt 62.1 kg (137 lb)   SpO2 97%   BMI 22.80 kg/m²      Physical exam:  CONST: alert, normal appearance, no acute distress, does not appear ill/toxic  HEAD: normocephalic, atraumatic  NECK: No JVD  ENT: MMM, no rhinorrhea/congestion, posterior oropharynx clear and oral secretions well controlled  EYES: PEPRL, EOMI, no scleral icterus, no nystagmus  CV: RRR, no murmurs, 2+ equal/symmetrical pulses x4, no appreciable BLE pitting edema  PULM: CTAB, no respiratory distress, not requiring supplental O2  ABD: soft, flat/non-tender/non-distended, no mass  SKIN: warm/dry, no pallor or jaundice, no rash  NEURO: A&Ox4, no facial asymmetry, no focal neuro deficits, gross strength/motor/sensation intact x4, normal gait  PSYCH: Appropriate affect      I personally reviewed and interpreted the following studies: " EKG is NSR 68, normal intervals, LAD/LAFB, no appreciable ischemia, labs are significant for unremarkable/noncontributory, no evidence of respiratory acidosis, images are notable for CXR no acute cardiopulmonary pathologies to include focal multifocal pulmonary opacities, chronic abdominal changes cardiac silhouette WNL .      MDM:  Patient presented to the ED for nonproductive cough for the last few weeks, COVID exposure, minimal nonbloody diarrhea, generalized weakness and fatigue.  Concerning PMHx of tobacco use/dependency, COPD, HTN, GERD.    Per Chart Review: Nothing pertinent to this ED encounter MI: Remote possible admission for COVID-19 on 5/5/2022 for LOS 3 days.    Assessment/evaluation consistent with illness anxiety and COVID exposure. No concerning history, clinical evidence/work-up, or exam findings for the concerning differentials of significant electrolyte/metabolic derangement, COVID/RSV viral syndrome/PNA, ACS/MI, focal/multifocal lobar PNA, COPD exacerbation/respiratory acidosis, PTX, acute HF/fluid overload. These conditions have been thoroughly evaluated and determined to be sufficiently unlikely to be the etiology of patient's presenting symptoms.     Scores: Heart 3 (1-2% MACE)    ED Course/Diagnosis:  ED Course as of 10/02/24 1557   Sat Sep 28, 2024   1432 I personally reviewed and interpreted the EKG @1419: NSR 68, normal intervals, no appreciable ischemia, LAD, LAFB, prior EKG on 5/8/2022 reviewed without any appreciable specific/identifiable changes, and low voltage criteria QS involving limb leads. [BC]      ED Course User Index  [BC] Beau Parish MD         Diagnoses as of 10/02/24 1557   COPD exacerbation (Multi)   Viral illness       1. COPD exacerbation (Multi)  predniSONE (Deltasone) 50 mg tablet    DISCONTINUED: predniSONE (Deltasone) 50 mg tablet    DISCONTINUED: predniSONE (Deltasone) 50 mg tablet      2. Viral illness                 Beau Parish MD  09/29/24  1147       Beau Parish MD  10/02/24 1551

## 2024-10-02 LAB
ATRIAL RATE: 68 BPM
P AXIS: 75 DEGREES
P OFFSET: 218 MS
P ONSET: 169 MS
PR INTERVAL: 118 MS
Q ONSET: 228 MS
QRS COUNT: 12 BEATS
QRS DURATION: 76 MS
QT INTERVAL: 396 MS
QTC CALCULATION(BAZETT): 421 MS
QTC FREDERICIA: 413 MS
R AXIS: -61 DEGREES
T AXIS: -40 DEGREES
T OFFSET: 426 MS
VENTRICULAR RATE: 68 BPM

## 2024-10-09 ENCOUNTER — APPOINTMENT (OUTPATIENT)
Dept: RADIOLOGY | Facility: HOSPITAL | Age: 59
End: 2024-10-09
Payer: COMMERCIAL

## 2024-11-20 ENCOUNTER — APPOINTMENT (OUTPATIENT)
Dept: OBSTETRICS AND GYNECOLOGY | Facility: CLINIC | Age: 59
End: 2024-11-20
Payer: COMMERCIAL

## 2024-11-20 VITALS
WEIGHT: 133.4 LBS | SYSTOLIC BLOOD PRESSURE: 118 MMHG | DIASTOLIC BLOOD PRESSURE: 88 MMHG | HEIGHT: 65 IN | BODY MASS INDEX: 22.23 KG/M2

## 2024-11-20 DIAGNOSIS — N89.8 VAGINAL DISCHARGE: Primary | ICD-10-CM

## 2024-11-20 DIAGNOSIS — N89.8 VAGINAL LESION: ICD-10-CM

## 2024-11-20 DIAGNOSIS — R32 URINARY INCONTINENCE, UNSPECIFIED TYPE: ICD-10-CM

## 2024-11-20 PROCEDURE — 87529 HSV DNA AMP PROBE: CPT

## 2024-11-20 PROCEDURE — 87491 CHLMYD TRACH DNA AMP PROBE: CPT

## 2024-11-20 PROCEDURE — 87591 N.GONORRHOEAE DNA AMP PROB: CPT

## 2024-11-20 PROCEDURE — 99214 OFFICE O/P EST MOD 30 MIN: CPT | Performed by: OBSTETRICS & GYNECOLOGY

## 2024-11-20 PROCEDURE — 3008F BODY MASS INDEX DOCD: CPT | Performed by: OBSTETRICS & GYNECOLOGY

## 2024-11-20 PROCEDURE — 87205 SMEAR GRAM STAIN: CPT

## 2024-11-20 PROCEDURE — 87109 MYCOPLASMA: CPT

## 2024-11-20 RX ORDER — MULTIVITAMIN WITH FOLIC ACID 400 MCG
1 TABLET ORAL
COMMUNITY
Start: 2024-11-03

## 2024-11-20 RX ORDER — IBUPROFEN 200 MG
1 TABLET ORAL
COMMUNITY
Start: 2024-10-04

## 2024-11-20 RX ORDER — MIRTAZAPINE 15 MG/1
15 TABLET, FILM COATED ORAL
COMMUNITY
Start: 2024-11-03

## 2024-11-20 ASSESSMENT — ENCOUNTER SYMPTOMS
RESPIRATORY NEGATIVE: 0
CONSTITUTIONAL NEGATIVE: 0
PSYCHIATRIC NEGATIVE: 0
ENDOCRINE NEGATIVE: 0
GASTROINTESTINAL NEGATIVE: 0
NEUROLOGICAL NEGATIVE: 0
CARDIOVASCULAR NEGATIVE: 0
ALLERGIC/IMMUNOLOGIC NEGATIVE: 0
MUSCULOSKELETAL NEGATIVE: 0
EYES NEGATIVE: 0
HEMATOLOGIC/LYMPHATIC NEGATIVE: 0

## 2024-11-20 ASSESSMENT — PAIN SCALES - GENERAL: PAINLEVEL_OUTOF10: 0-NO PAIN

## 2024-11-20 NOTE — PATIENT INSTRUCTIONS
Thanks for coming in today for follow-up.    Cultures were sent and results should be available in the next 24 to 72 hours.  You may call the office and select option #2 to speak with the nurse to obtain the results.      Follow-up with one of our urogynecologist about your bladder issues.      Return to the office for your annual GYN exam or as needed.      Follow-up with your PCP and other healthcare specialist as needed.

## 2024-11-20 NOTE — PROGRESS NOTES
Assessment and Plan:  Kim Anguiano is a 60 y/o post menopausal  presents today with vaginal discharge.    Diagnoses:  #1 Vaginal discharge  #2 Pelvic pain   #3 Urinary incontinence  #4 Vaginal lesion    Assessment/Plan:  1. Vaginal discharge and some vaginal pain  - BV swab sent   - GC and CT testing sent.  - Mycoplasma and ureaplasma swab sent  - HSV swab of posterior fourchette and vulvar/vaginal lesions sent. This was discussed with patient. She declines HSV antibody testing because of a needle phobia.    2. Urinary incontinence   - Referral to urogynecology  - Patient should RTC for annual GYN exam and PRN   - She is to follow up with her PCP and other healthcare specialists PRN.    Follow up with Dr. Garcia in 1 year.     Scribe Attestation  By signing my name below, I, Moira Fergusoni. Scribe, attest that this documentation has been prepared under the direction and in the presence of Shanelle Garcia MD on 2024 at 12:03 PM.     HPI:   Kim Anguiano is a 60 y/o post menopausal  presents today with vaginal discharge. She reports a slight vaginal odor and brown vaginal discharge for 4 months. She expressed her OBGYN did not treat her for this. Additionally, she notes intermittent pelvic pain, urinary incontinence and denies dysuria and vaginal bleeding. She is not currently SA. She is interested in pursuing treatment for urinary incontinence.    Imaging:  2024 U/S revealed a 7cm uterus with a 2mm endometrial stripe and normal appearing ovaries.    ROS:  Review of Systems   Genitourinary:  Positive for pelvic pain and vaginal discharge (brown).        Positive vaginal odor and urinary incontinence       Physical Exam:   Physical Exam  Constitutional:       General: She is not in acute distress.     Appearance: Normal appearance. She is normal weight.   Genitourinary:      Vulva exam comments: Normal appearing external female genitalia with some external hemmorhoids at the posterior  fourchette there are 3-4 tender erythematous raised/possible ulcerative type of lesions that are tender with q-tip application..      Vaginal exam comments: Somewhat atrophic but well supported. .        Right Adnexa: not tender and no mass present.     Left Adnexa: not tender and no mass present.     No cervical motion tenderness.      Uterus is not enlarged, fixed or tender.      No uterine mass detected.  Abdominal:      General: There is no distension.      Palpations: Abdomen is soft.      Tenderness: There is no abdominal tenderness.   Neurological:      Mental Status: She is alert and oriented to person, place, and time.

## 2024-11-21 LAB
C TRACH RRNA SPEC QL NAA+PROBE: NEGATIVE
HSV1 DNA SKIN QL NAA+PROBE: NOT DETECTED
HSV2 DNA SKIN QL NAA+PROBE: NOT DETECTED
N GONORRHOEA DNA SPEC QL PROBE+SIG AMP: NEGATIVE

## 2024-11-24 LAB — UREAPLASMA/MYCOPLASMA CULTURE: NORMAL

## 2024-11-25 ENCOUNTER — TELEPHONE (OUTPATIENT)
Dept: OBSTETRICS AND GYNECOLOGY | Facility: CLINIC | Age: 59
End: 2024-11-25
Payer: COMMERCIAL

## 2024-11-25 NOTE — TELEPHONE ENCOUNTER
Pt verified by name and .  Pt is aware per Dr. Garcia:  HSV cx negative.   Pt has no questions at this time.

## 2024-12-03 ENCOUNTER — OFFICE VISIT (OUTPATIENT)
Dept: OBSTETRICS AND GYNECOLOGY | Facility: CLINIC | Age: 59
End: 2024-12-03
Payer: COMMERCIAL

## 2024-12-03 VITALS
BODY MASS INDEX: 22.36 KG/M2 | DIASTOLIC BLOOD PRESSURE: 89 MMHG | HEIGHT: 65 IN | WEIGHT: 134.2 LBS | SYSTOLIC BLOOD PRESSURE: 136 MMHG | HEART RATE: 76 BPM

## 2024-12-03 DIAGNOSIS — R32 URINARY INCONTINENCE, UNSPECIFIED TYPE: Primary | ICD-10-CM

## 2024-12-03 DIAGNOSIS — N95.2 VAGINAL ATROPHY: ICD-10-CM

## 2024-12-03 LAB
POC APPEARANCE, URINE: CLEAR
POC BILIRUBIN, URINE: NEGATIVE
POC BLOOD, URINE: NEGATIVE
POC COLOR, URINE: ABNORMAL
POC GLUCOSE, URINE: NEGATIVE MG/DL
POC KETONES, URINE: NEGATIVE MG/DL
POC LEUKOCYTES, URINE: ABNORMAL
POC NITRITE,URINE: NEGATIVE
POC PH, URINE: 6 PH
POC PROTEIN, URINE: ABNORMAL MG/DL
POC SPECIFIC GRAVITY, URINE: 1.02
POC UROBILINOGEN, URINE: 0.2 EU/DL

## 2024-12-03 PROCEDURE — 81003 URINALYSIS AUTO W/O SCOPE: CPT | Performed by: NURSE PRACTITIONER

## 2024-12-03 PROCEDURE — 51798 US URINE CAPACITY MEASURE: CPT | Performed by: NURSE PRACTITIONER

## 2024-12-03 PROCEDURE — 99213 OFFICE O/P EST LOW 20 MIN: CPT | Performed by: NURSE PRACTITIONER

## 2024-12-03 PROCEDURE — 3008F BODY MASS INDEX DOCD: CPT | Performed by: NURSE PRACTITIONER

## 2024-12-03 RX ORDER — SOLIFENACIN SUCCINATE 5 MG/1
5 TABLET, FILM COATED ORAL DAILY
Qty: 30 TABLET | Refills: 11 | Status: SHIPPED | OUTPATIENT
Start: 2024-12-03 | End: 2025-12-03

## 2024-12-03 RX ORDER — ESTRADIOL 0.1 MG/G
1 CREAM VAGINAL 2 TIMES WEEKLY
Qty: 42.5 G | Refills: 2 | Status: SHIPPED | OUTPATIENT
Start: 2024-12-05

## 2024-12-03 ASSESSMENT — ENCOUNTER SYMPTOMS
MUSCULOSKELETAL NEGATIVE: 1
RESPIRATORY NEGATIVE: 1
HEMATOLOGIC/LYMPHATIC NEGATIVE: 1
FREQUENCY: 1
ALLERGIC/IMMUNOLOGIC NEGATIVE: 1
EYES NEGATIVE: 1
GASTROINTESTINAL NEGATIVE: 1
ENDOCRINE NEGATIVE: 1
PSYCHIATRIC NEGATIVE: 1
CONSTITUTIONAL NEGATIVE: 1
CARDIOVASCULAR NEGATIVE: 1
NEUROLOGICAL NEGATIVE: 1

## 2024-12-03 ASSESSMENT — PAIN SCALES - GENERAL: PAINLEVEL_OUTOF10: 0-NO PAIN

## 2024-12-03 NOTE — PROGRESS NOTES
Referring Physician: Shanelle Garcia MD     General medical background: None    Obstetric/Gynecologic History:  Kim Anguiano has a history of : 6. Para: 3. During the delivery, Patient is not currently sexually active    Past Evaluation and Treatment::  Past evaluation includes: This problem has not been previously evaluated  Past treatment includes: This problem has not been previously treated    Review of Systems   Constitutional: Negative.    HENT: Negative.     Eyes: Negative.    Respiratory: Negative.     Cardiovascular: Negative.    Gastrointestinal: Negative.    Endocrine: Negative.    Genitourinary:  Positive for frequency and urgency.   Musculoskeletal: Negative.    Skin: Negative.    Allergic/Immunologic: Negative.    Neurological: Negative.    Hematological: Negative.    Psychiatric/Behavioral: Negative.          HPI  - She is goes to the bathroom every 15 min or so during the day and night.  - Noted that she does not wear pads but when she leaks she changes her clothing and cleans up.  - When she urinates she has a full stream.  - She drinks ginger ale and water.  - Noted that she tries to go to bed around midnight but doesn't fall asleep until 4 am.  Most of her rest is done during the day.    - She drinks fluids at night and keeps water and ginger ale on her bedside table.  - When she goes shopping the first and last thing she does is go the the bathroom.  Noted that she is unable to go on a car ride longer than 30 minutes.  - On vacations she has to wear depends.    Urinary Incontinence Questions:  Looking back, how long do you think that you have been affected by your urinary complaints? A couple of years  Have you seen a physician or medical provider for this?Yes  Have you ever been prescribed any medication for this? No  Have you ever taken any medication for this? No  How many different medications have you tried or been prescribed? 0  How frustrated are you with your bladder symptoms?  Moderately frustrated    Testing results:  PVR results are available and reviewed  : 0 ml  UA results available and reviewed  Laboratory:   Urine dipstick shows  trace protein and small leukocytes .      History:  Stress Symptoms:   Does coughing hard cause you to lose urine? 3 - Never  Does sneezing cause you to lose urine? 3 - Often  Does lifting things cause you to lose urine? 2 - Sometimes  Does bending cause you to lose urine? 2 - Sometimes  Does laughing cause you to lose urine? 3 - Often  Do you leak urine with intercourse? 0 - Never  Score:  Urge Symptoms:   Do you have blood in your urine? 2 - Sometimes  Do you feel like you don't empty your bladder completely? 3 - Often  How often do you urinate at night? 3 - Often  Score:  Vaginal Symptoms:   Denies   Score:  Rectal Symptoms:   Do you need to strain to have a bowel movement? 0 - Never  Do you feel that you have not completely emptied your bowels at the end of a bowel movement? 0 - Never      Physical Exam  Constitutional:       General: She is not in acute distress.     Appearance: Normal appearance.   Genitourinary:      Moderate vaginal atrophy present.  HENT:      Head: Normocephalic and atraumatic.   Neurological:      General: No focal deficit present.      Mental Status: She is alert and oriented to person, place, and time.   Psychiatric:         Mood and Affect: Mood normal.         Behavior: Behavior normal.         Thought Content: Thought content normal.         Judgment: Judgment normal.          Assessment and Plan  Assessment:   59 y.o. female being assessed for urinary incontinence and vaginal atrophy. Comorbidities include: HTN.    Diagnosis List:   #1 Urinary incontinence   #2 Vaginal atrophy    Plan:  Urinary incontinence  - Counseled patient that starting at 10 pm she should limit fluids which should improve her nocturia.  - Reviewed the risk benefits of oral medications to treat OAB.  - We will start Vesicare 5 mg daily and Estrace  vaginal cream 2 times weekly.    2.    Vaginal atrophy  - Reviewed the risk benefits of transvaginal estrogen cream.  - I refilled her Estrace cream 2 times weekly.    Follow up in 6 weeks for a med check     Trang MENESES, dayna scribing for virtually, and in the presence of SUBHA Scott on 12/03/24 4:02 PM.   I, SUBHA Hamilton, personally performed the services described in this documentation which was scribed virtually and I confirm that it is both accurate and complete.      SUBHA Hamilton

## 2024-12-07 ENCOUNTER — HOSPITAL ENCOUNTER (EMERGENCY)
Facility: HOSPITAL | Age: 59
Discharge: HOME | End: 2024-12-07
Attending: STUDENT IN AN ORGANIZED HEALTH CARE EDUCATION/TRAINING PROGRAM
Payer: COMMERCIAL

## 2024-12-07 ENCOUNTER — APPOINTMENT (OUTPATIENT)
Dept: CARDIOLOGY | Facility: HOSPITAL | Age: 59
End: 2024-12-07
Payer: COMMERCIAL

## 2024-12-07 ENCOUNTER — APPOINTMENT (OUTPATIENT)
Dept: RADIOLOGY | Facility: HOSPITAL | Age: 59
End: 2024-12-07
Payer: COMMERCIAL

## 2024-12-07 VITALS
WEIGHT: 135 LBS | SYSTOLIC BLOOD PRESSURE: 141 MMHG | BODY MASS INDEX: 22.49 KG/M2 | OXYGEN SATURATION: 97 % | HEIGHT: 65 IN | TEMPERATURE: 97 F | DIASTOLIC BLOOD PRESSURE: 80 MMHG | RESPIRATION RATE: 18 BRPM | HEART RATE: 76 BPM

## 2024-12-07 DIAGNOSIS — J20.9 ACUTE BRONCHITIS, UNSPECIFIED ORGANISM: Primary | ICD-10-CM

## 2024-12-07 DIAGNOSIS — J44.1 COPD EXACERBATION (MULTI): ICD-10-CM

## 2024-12-07 DIAGNOSIS — N39.0 ACUTE UTI (URINARY TRACT INFECTION): ICD-10-CM

## 2024-12-07 LAB
ALBUMIN SERPL BCP-MCNC: 4.1 G/DL (ref 3.4–5)
ALP SERPL-CCNC: 62 U/L (ref 33–136)
ALT SERPL W P-5'-P-CCNC: 7 U/L (ref 7–45)
ANION GAP SERPL CALCULATED.3IONS-SCNC: 10 MMOL/L (ref 10–20)
APPEARANCE UR: CLEAR
AST SERPL W P-5'-P-CCNC: 13 U/L (ref 9–39)
BASOPHILS # BLD AUTO: 0.03 X10*3/UL (ref 0–0.1)
BASOPHILS NFR BLD AUTO: 0.4 %
BILIRUB SERPL-MCNC: 0.6 MG/DL (ref 0–1.2)
BILIRUB UR STRIP.AUTO-MCNC: NEGATIVE MG/DL
BNP SERPL-MCNC: 30 PG/ML (ref 0–99)
BUN SERPL-MCNC: 9 MG/DL (ref 6–23)
CALCIUM SERPL-MCNC: 8.7 MG/DL (ref 8.6–10.3)
CARDIAC TROPONIN I PNL SERPL HS: 4 NG/L (ref 0–13)
CARDIAC TROPONIN I PNL SERPL HS: <3 NG/L (ref 0–13)
CHLORIDE SERPL-SCNC: 107 MMOL/L (ref 98–107)
CO2 SERPL-SCNC: 27 MMOL/L (ref 21–32)
COLOR UR: YELLOW
CREAT SERPL-MCNC: 0.98 MG/DL (ref 0.5–1.05)
EGFRCR SERPLBLD CKD-EPI 2021: 66 ML/MIN/1.73M*2
EOSINOPHIL # BLD AUTO: 0.1 X10*3/UL (ref 0–0.7)
EOSINOPHIL NFR BLD AUTO: 1.3 %
ERYTHROCYTE [DISTWIDTH] IN BLOOD BY AUTOMATED COUNT: 12.6 % (ref 11.5–14.5)
FLUAV RNA RESP QL NAA+PROBE: NOT DETECTED
FLUBV RNA RESP QL NAA+PROBE: NOT DETECTED
GLUCOSE SERPL-MCNC: 114 MG/DL (ref 74–99)
GLUCOSE UR STRIP.AUTO-MCNC: NORMAL MG/DL
HCT VFR BLD AUTO: 44.6 % (ref 36–46)
HGB BLD-MCNC: 14.6 G/DL (ref 12–16)
IMM GRANULOCYTES # BLD AUTO: 0.02 X10*3/UL (ref 0–0.7)
IMM GRANULOCYTES NFR BLD AUTO: 0.3 % (ref 0–0.9)
KETONES UR STRIP.AUTO-MCNC: NEGATIVE MG/DL
LEUKOCYTE ESTERASE UR QL STRIP.AUTO: ABNORMAL
LYMPHOCYTES # BLD AUTO: 2.53 X10*3/UL (ref 1.2–4.8)
LYMPHOCYTES NFR BLD AUTO: 34 %
MAGNESIUM SERPL-MCNC: 1.9 MG/DL (ref 1.6–2.4)
MCH RBC QN AUTO: 30.6 PG (ref 26–34)
MCHC RBC AUTO-ENTMCNC: 32.7 G/DL (ref 32–36)
MCV RBC AUTO: 94 FL (ref 80–100)
MONOCYTES # BLD AUTO: 0.47 X10*3/UL (ref 0.1–1)
MONOCYTES NFR BLD AUTO: 6.3 %
MUCOUS THREADS #/AREA URNS AUTO: ABNORMAL /LPF
NEUTROPHILS # BLD AUTO: 4.3 X10*3/UL (ref 1.2–7.7)
NEUTROPHILS NFR BLD AUTO: 57.7 %
NITRITE UR QL STRIP.AUTO: NEGATIVE
NRBC BLD-RTO: 0 /100 WBCS (ref 0–0)
PH UR STRIP.AUTO: 5.5 [PH]
PLATELET # BLD AUTO: 197 X10*3/UL (ref 150–450)
POTASSIUM SERPL-SCNC: 3.6 MMOL/L (ref 3.5–5.3)
PROT SERPL-MCNC: 6.7 G/DL (ref 6.4–8.2)
PROT UR STRIP.AUTO-MCNC: NEGATIVE MG/DL
RBC # BLD AUTO: 4.77 X10*6/UL (ref 4–5.2)
RBC # UR STRIP.AUTO: NEGATIVE /UL
RBC #/AREA URNS AUTO: ABNORMAL /HPF
RSV RNA RESP QL NAA+PROBE: NOT DETECTED
SARS-COV-2 RNA RESP QL NAA+PROBE: NOT DETECTED
SODIUM SERPL-SCNC: 140 MMOL/L (ref 136–145)
SP GR UR STRIP.AUTO: 1.02
UROBILINOGEN UR STRIP.AUTO-MCNC: NORMAL MG/DL
WBC # BLD AUTO: 7.5 X10*3/UL (ref 4.4–11.3)
WBC #/AREA URNS AUTO: ABNORMAL /HPF

## 2024-12-07 PROCEDURE — 83880 ASSAY OF NATRIURETIC PEPTIDE: CPT | Performed by: PHYSICIAN ASSISTANT

## 2024-12-07 PROCEDURE — 84484 ASSAY OF TROPONIN QUANT: CPT | Performed by: PHYSICIAN ASSISTANT

## 2024-12-07 PROCEDURE — 96375 TX/PRO/DX INJ NEW DRUG ADDON: CPT

## 2024-12-07 PROCEDURE — 74177 CT ABD & PELVIS W/CONTRAST: CPT | Mod: FOREIGN READ | Performed by: RADIOLOGY

## 2024-12-07 PROCEDURE — 71046 X-RAY EXAM CHEST 2 VIEWS: CPT | Mod: FOREIGN READ | Performed by: RADIOLOGY

## 2024-12-07 PROCEDURE — 87637 SARSCOV2&INF A&B&RSV AMP PRB: CPT | Performed by: PHYSICIAN ASSISTANT

## 2024-12-07 PROCEDURE — 94640 AIRWAY INHALATION TREATMENT: CPT

## 2024-12-07 PROCEDURE — 74177 CT ABD & PELVIS W/CONTRAST: CPT

## 2024-12-07 PROCEDURE — 2500000001 HC RX 250 WO HCPCS SELF ADMINISTERED DRUGS (ALT 637 FOR MEDICARE OP): Performed by: PHYSICIAN ASSISTANT

## 2024-12-07 PROCEDURE — 83735 ASSAY OF MAGNESIUM: CPT | Performed by: PHYSICIAN ASSISTANT

## 2024-12-07 PROCEDURE — 96374 THER/PROPH/DIAG INJ IV PUSH: CPT | Mod: 59

## 2024-12-07 PROCEDURE — 2550000001 HC RX 255 CONTRASTS: Performed by: PHYSICIAN ASSISTANT

## 2024-12-07 PROCEDURE — 80053 COMPREHEN METABOLIC PANEL: CPT | Performed by: PHYSICIAN ASSISTANT

## 2024-12-07 PROCEDURE — 36415 COLL VENOUS BLD VENIPUNCTURE: CPT | Performed by: PHYSICIAN ASSISTANT

## 2024-12-07 PROCEDURE — 2500000004 HC RX 250 GENERAL PHARMACY W/ HCPCS (ALT 636 FOR OP/ED): Performed by: PHYSICIAN ASSISTANT

## 2024-12-07 PROCEDURE — 81001 URINALYSIS AUTO W/SCOPE: CPT | Performed by: PHYSICIAN ASSISTANT

## 2024-12-07 PROCEDURE — 99285 EMERGENCY DEPT VISIT HI MDM: CPT | Mod: 25 | Performed by: STUDENT IN AN ORGANIZED HEALTH CARE EDUCATION/TRAINING PROGRAM

## 2024-12-07 PROCEDURE — 87086 URINE CULTURE/COLONY COUNT: CPT | Mod: WESLAB | Performed by: PHYSICIAN ASSISTANT

## 2024-12-07 PROCEDURE — 85025 COMPLETE CBC W/AUTO DIFF WBC: CPT | Performed by: PHYSICIAN ASSISTANT

## 2024-12-07 PROCEDURE — 71046 X-RAY EXAM CHEST 2 VIEWS: CPT

## 2024-12-07 PROCEDURE — 2500000002 HC RX 250 W HCPCS SELF ADMINISTERED DRUGS (ALT 637 FOR MEDICARE OP, ALT 636 FOR OP/ED): Performed by: PHYSICIAN ASSISTANT

## 2024-12-07 PROCEDURE — 93005 ELECTROCARDIOGRAM TRACING: CPT

## 2024-12-07 RX ORDER — CEFTRIAXONE 1 G/50ML
1 INJECTION, SOLUTION INTRAVENOUS ONCE
Status: COMPLETED | OUTPATIENT
Start: 2024-12-07 | End: 2024-12-07

## 2024-12-07 RX ORDER — ALUMINUM HYDROXIDE, MAGNESIUM HYDROXIDE, AND SIMETHICONE 1200; 120; 1200 MG/30ML; MG/30ML; MG/30ML
30 SUSPENSION ORAL ONCE
Status: COMPLETED | OUTPATIENT
Start: 2024-12-07 | End: 2024-12-07

## 2024-12-07 RX ORDER — IPRATROPIUM BROMIDE AND ALBUTEROL SULFATE 2.5; .5 MG/3ML; MG/3ML
3 SOLUTION RESPIRATORY (INHALATION) ONCE
Status: COMPLETED | OUTPATIENT
Start: 2024-12-07 | End: 2024-12-07

## 2024-12-07 RX ORDER — CEPHALEXIN 500 MG/1
500 CAPSULE ORAL 3 TIMES DAILY
Qty: 15 CAPSULE | Refills: 0 | Status: SHIPPED | OUTPATIENT
Start: 2024-12-07 | End: 2024-12-12

## 2024-12-07 RX ORDER — PREDNISONE 20 MG/1
TABLET ORAL
Qty: 11 TABLET | Refills: 0 | Status: SHIPPED | OUTPATIENT
Start: 2024-12-07 | End: 2024-12-16

## 2024-12-07 RX ORDER — ALBUTEROL SULFATE 0.83 MG/ML
2.5 SOLUTION RESPIRATORY (INHALATION) EVERY 6 HOURS PRN
Qty: 3 ML | Refills: 0 | Status: SHIPPED | OUTPATIENT
Start: 2024-12-07 | End: 2025-01-06

## 2024-12-07 RX ADMIN — ALUMINUM HYDROXIDE, MAGNESIUM HYDROXIDE, AND SIMETHICONE 30 ML: 200; 200; 20 SUSPENSION ORAL at 16:58

## 2024-12-07 RX ADMIN — IPRATROPIUM BROMIDE AND ALBUTEROL SULFATE 3 ML: .5; 3 SOLUTION RESPIRATORY (INHALATION) at 15:24

## 2024-12-07 RX ADMIN — IOHEXOL 75 ML: 350 INJECTION, SOLUTION INTRAVENOUS at 15:58

## 2024-12-07 RX ADMIN — METHYLPREDNISOLONE SODIUM SUCCINATE 125 MG: 125 INJECTION, POWDER, FOR SOLUTION INTRAMUSCULAR; INTRAVENOUS at 15:24

## 2024-12-07 RX ADMIN — CEFTRIAXONE SODIUM 1 G: 1 INJECTION, SOLUTION INTRAVENOUS at 16:41

## 2024-12-07 ASSESSMENT — LIFESTYLE VARIABLES
EVER FELT BAD OR GUILTY ABOUT YOUR DRINKING: NO
TOTAL SCORE: 0
HAVE YOU EVER FELT YOU SHOULD CUT DOWN ON YOUR DRINKING: NO
EVER HAD A DRINK FIRST THING IN THE MORNING TO STEADY YOUR NERVES TO GET RID OF A HANGOVER: NO
HAVE PEOPLE ANNOYED YOU BY CRITICIZING YOUR DRINKING: NO

## 2024-12-07 ASSESSMENT — PAIN SCALES - GENERAL: PAINLEVEL_OUTOF10: 9

## 2024-12-07 ASSESSMENT — COLUMBIA-SUICIDE SEVERITY RATING SCALE - C-SSRS
1. IN THE PAST MONTH, HAVE YOU WISHED YOU WERE DEAD OR WISHED YOU COULD GO TO SLEEP AND NOT WAKE UP?: NO
5. HAVE YOU STARTED TO WORK OUT OR WORKED OUT THE DETAILS OF HOW TO KILL YOURSELF? DO YOU INTEND TO CARRY OUT THIS PLAN?: NO
4. HAVE YOU HAD THESE THOUGHTS AND HAD SOME INTENTION OF ACTING ON THEM?: NO
6. HAVE YOU EVER DONE ANYTHING, STARTED TO DO ANYTHING, OR PREPARED TO DO ANYTHING TO END YOUR LIFE?: NO
2. HAVE YOU ACTUALLY HAD ANY THOUGHTS OF KILLING YOURSELF?: NO
6. HAVE YOU EVER DONE ANYTHING, STARTED TO DO ANYTHING, OR PREPARED TO DO ANYTHING TO END YOUR LIFE?: NO

## 2024-12-07 ASSESSMENT — PAIN DESCRIPTION - FREQUENCY: FREQUENCY: CONSTANT/CONTINUOUS

## 2024-12-07 ASSESSMENT — PAIN DESCRIPTION - LOCATION: LOCATION: THROAT

## 2024-12-07 ASSESSMENT — PAIN DESCRIPTION - PAIN TYPE: TYPE: ACUTE PAIN

## 2024-12-07 ASSESSMENT — PAIN - FUNCTIONAL ASSESSMENT: PAIN_FUNCTIONAL_ASSESSMENT: 0-10

## 2024-12-07 NOTE — ED PROVIDER NOTES
"The patient was seen in conjunction with the advanced practice provider, and I performed a substantive portion of the encounter. The following is my supervisory note.    History:  Patient presents to the ED for diffuse anterior chest wall pain.  Notes pain is 8, worse after tussive episodes, nonradiating, mild to moderate in severity, no alleviating factors and intermittent with variable duration.  States she has been diagnosed with bronchitis fairly recent and also has a history of COPD.  States her COPD is exacerbated with weather changes.  Notes pain associated with her tussive episodes.  States has been coughing up clear and yellowish phlegm.  Denies any appreciable respiratory weight gain or bilateral/unilateral leg swelling.  Denies any lightheadedness/dizziness, diaphoresis, or associated nausea.    VS:  /80 (BP Location: Left arm, Patient Position: Sitting)   Pulse 76   Temp 36.1 °C (97 °F)   Resp 18   Ht 1.651 m (5' 5\")   Wt 61.2 kg (135 lb)   SpO2 97%   BMI 22.47 kg/m²      Physical exam:  CONST: alert, normal appearance, no acute distress, does not appear ill/toxic  NECK: no JVD  CV:  RRR, no murmurs, 2+ equal/symmetrical pulses x4, no BLE or unilateral leg edema/swelling  PULM: CTAB, no respiratory distress, not requiring supplemental O2  ABD: soft, flat/non-tender/non-distended, no mass  SKIN: warm/dry, no pallor or jaundice, no rash  NEURO: A&Ox4, no focal neuro deficits      I personally reviewed and interpreted the following studies: EKG is NSR 83, normal axis/intervals, poor R wave progression in precordial leads, nonspecific TW findings, no appreciable ischemia, labs are significant for mild to moderate leuk esterase with mild pyuria and negative nitrates no reported symptoms, images are notable for CXR no evidence of pulmonary opacities .      MDM:  Patient presented to the ED for chest wall pain posttussive for 1 week.  Concerning PMHx of COPD, GERD.    Per Chart Review: Nothing " pertinent to this ED encounter.    Assessment/evaluation consistent with likely costochondritis complicated by chest wall myofascial pain/strain of intercostal muscles. No concerning history, clinical evidence/work-up, or exam findings for the concerning differentials of ACS/MI, PTX, focal/multifocal PNA, low sufficient for PE, COVID/influenza/RSV viral syndrome/PNA, significant electrolyte/metabolic derangement. These conditions have been thoroughly evaluated and determined to be sufficiently unlikely to be the etiology of patient's presenting symptoms.     Scores: Heart 2 (1-2% MACE)    ED Course/Diagnosis:  ED Course as of 12/07/24 2028   Sat Dec 07, 2024   1637 Discussed CT findings with OB on-call, Dr. Anand in regards to possible dilated uterine veins with congestion.  She states that patient should follow-up with her OB/GYN nothing acute to do in the emergency department. [SH]   1639 HEART SCORE 2 [SH]   1753 I personally reviewed and interpreted the EKG @1753: NSR 83, normal axis/intervals and no appreciable ischemia, poor R wave progression in precordial leads, non-specific TW findings, and prior EKG on 9/28/2024 reviewed without any appreciable specific/identifiable changes [BC]      ED Course User Index  [BC] Beau Parish MD  [] Flakita Penn PA-C         Diagnoses as of 12/07/24 2028   COPD exacerbation (Multi)   Acute bronchitis, unspecified organism   Acute UTI (urinary tract infection)       1. Acute bronchitis, unspecified organism  predniSONE (Deltasone) 20 mg tablet    albuterol 2.5 mg /3 mL (0.083 %) nebulizer solution      2. COPD exacerbation (Multi)  predniSONE (Deltasone) 20 mg tablet    albuterol 2.5 mg /3 mL (0.083 %) nebulizer solution      3. Acute UTI (urinary tract infection)  cephalexin (Keflex) 500 mg capsule               Beau Parish MD  12/07/24 2028

## 2024-12-07 NOTE — DISCHARGE INSTRUCTIONS
Your chest x-ray today showed no evidence of pneumonia.  Your laboratory evaluation was reassuring.  CT scan of your abdomen pelvis did show that you have some dilated veins in your uterus.  This may be contributing to your lower pelvic discomfort.  Please continue to follow-up with your OB/GYN.  I did discharge you with a prescription for prednisone and albuterol nebulizers to use.  I do recommend using your nebulizer machine every 4-6 hours until your shortness of breath and wheezing and cough improves.  Please return to the emergency department he has worsening symptoms including worsening cough, fever, chills, worsening shortness of breath or chest pain.    Your urine did show evidence of UTI.  Please take cephalexin as prescribed.  Please follow-up with your primary care doctor for urine culture results.

## 2024-12-07 NOTE — ED PROVIDER NOTES
HPI   Chief Complaint   Patient presents with    Flu Symptoms       This is a 59-year-old female presenting to the emergency department with complaints of coughing Imes 1 week.  Patient does have a history of COPD.  She states over the last week her shortness of breath has increased and she is coughing up green to yellow mucus.  He states over the last week she has also been having pain over the front part of her chest that is been constant with no significant alleviating or exacerbating factors.  Patient has not noted any pedal edema bilaterally.  Patient denies subjective fever or chills, nasal congestion, ear pain or throat pain.  Patient states she has been having lower abdominal pain over the last 2 weeks.  She has been seen by OB and had swabs done.  She has not had any imaging.      Please see HPI for pertinent positive and negative ROS.         Patient History   Past Medical History:   Diagnosis Date    Personal history of other diseases of the circulatory system     History of hypertension    Personal history of other diseases of the nervous system and sense organs     History of obstructive sleep apnea    Personal history of other diseases of the respiratory system     History of chronic obstructive lung disease    Personal history of other diseases of the respiratory system 2020    History of chronic obstructive lung disease    Personal history of other specified conditions     History of chest pain    Prediabetes     Pre-diabetes     Past Surgical History:   Procedure Laterality Date    OTHER SURGICAL HISTORY  2019    Cardiac catheterization    OTHER SURGICAL HISTORY  2019    Oral surgery    OTHER SURGICAL HISTORY  2019    Surgically induced      No family history on file.  Social History     Tobacco Use    Smoking status: Every Day     Types: Cigarettes    Smokeless tobacco: Never   Vaping Use    Vaping status: Never Used   Substance Use Topics    Alcohol use: Yes    Drug  use: Never       Physical Exam   ED Triage Vitals [12/07/24 1416]   Temperature Heart Rate Respirations BP   36.1 °C (97 °F) 71 20 (!) 136/93      Pulse Ox Temp Source Heart Rate Source Patient Position   99 % Temporal Monitor Sitting      BP Location FiO2 (%)     Right arm --       Physical Exam  GENERAL APPEARANCE: Awake and alert. No acute respiratory distress.   VITAL SIGNS: As per the nurses' triage record.  HEENT: Normocephalic, atraumatic. Extraocular muscles are intact. Conjunctiva are pink. Negative scleral icterus. Mucous membranes are moist. Tongue in the midline. Oropharynx clear, uvula midline.   NECK: Soft, nontender and supple, full gross ROM, no meningeal signs.  CHEST: Nontender to palpation. Clear to auscultation bilaterally. No rales, rhonchi, or wheezing. Symmetric rise and fall of chest wall.   HEART: Clear S1 and S2. Regular rate and rhythm.  Strong and equal pulses in the extremities.  ABDOMEN: Soft, mild tenderness to palpation in the lower quadrants bilaterally, nondistended  MUSCULOSKELETAL: The calves are nontender to palpation. Full gross active range of motion. Ambulating on own with no acute difficulties  NEUROLOGICAL: Awake, alert and oriented x 3. Motor power intact in the upper and lower extremities. Sensation is intact to light touch in the upper and lower extremities. Patient answering questions appropriately.   IMMUNOLOGICAL: No lymphatic streaking noted  DERMATOLOGIC: Warm and dry without petechiae, rashes, or ecchymosis noted on visible skin.   PYSCH: Cooperative with appropriate mood and affect.    ED Course & MDM   ED Course as of 12/07/24 1944   Sat Dec 07, 2024   1637 Discussed CT findings with OB on-call, Dr. Anand in regards to possible dilated uterine veins with congestion.  She states that patient should follow-up with her OB/GYN nothing acute to do in the emergency department. [SH]   1639 HEART SCORE 2 [SH]   1753 I personally reviewed and interpreted the EKG @1753:  NSR 83, normal axis/intervals and no appreciable ischemia, poor R wave progression in precordial leads, non-specific TW findings, and prior EKG on 9/28/2024 reviewed without any appreciable specific/identifiable changes [BC]      ED Course User Index  [BC] Beau Parish MD  [] Flakita Penn PA-C         Diagnoses as of 12/07/24 1944   COPD exacerbation (Multi)   Acute bronchitis, unspecified organism   Acute UTI (urinary tract infection)                 No data recorded     Jania Coma Scale Score: 15 (12/07/24 1412 : Vivien Fritz, RN)                           Medical Decision Making  Parts of this chart have been completed using voice recognition software. Please excuse any errors of transcription.  My thought process and reason for plan has been formulated from the time that I saw the patient until the time of disposition and is not specific to one specific moment during their visit and furthermore my MDM encompasses this entire chart and not only this text box.      HPI: Detailed above.    Exam: A medically appropriate exam performed, outlined above, given the known history and presentation.    History obtained from: Patient    Medications given during visit:  Medications   ipratropium-albuteroL (Duo-Neb) 0.5-2.5 mg/3 mL nebulizer solution 3 mL (3 mL nebulization Given 12/7/24 1524)   ipratropium-albuteroL (Duo-Neb) 0.5-2.5 mg/3 mL nebulizer solution 3 mL (3 mL nebulization Given 12/7/24 1524)   methylPREDNISolone sod succinate (SOLU-Medrol) injection 125 mg (125 mg intravenous Given 12/7/24 1524)   iohexol (OMNIPaque) 350 mg iodine/mL solution 75 mL (75 mL intravenous Given 12/7/24 1558)   cefTRIAXone (Rocephin) 1 g in dextrose (iso) IV 50 mL (0 g intravenous Stopped 12/7/24 1655)   alum-mag hydroxide-simeth (Mylanta) 200-200-20 mg/5 mL oral suspension 30 mL (30 mL oral Given 12/7/24 1658)        Diagnostic/tests  Labs Reviewed   COMPREHENSIVE METABOLIC PANEL - Abnormal       Result Value    Glucose  114 (*)     Sodium 140      Potassium 3.6      Chloride 107      Bicarbonate 27      Anion Gap 10      Urea Nitrogen 9      Creatinine 0.98      eGFR 66      Calcium 8.7      Albumin 4.1      Alkaline Phosphatase 62      Total Protein 6.7      AST 13      Bilirubin, Total 0.6      ALT 7     URINALYSIS WITH REFLEX CULTURE AND MICROSCOPIC - Abnormal    Color, Urine Yellow      Appearance, Urine Clear      Specific Gravity, Urine 1.024      pH, Urine 5.5      Protein, Urine NEGATIVE      Glucose, Urine Normal      Blood, Urine NEGATIVE      Ketones, Urine NEGATIVE      Bilirubin, Urine NEGATIVE      Urobilinogen, Urine Normal      Nitrite, Urine NEGATIVE      Leukocyte Esterase, Urine 250 Diego/µL (*)    MICROSCOPIC ONLY, URINE - Abnormal    WBC, Urine 6-10 (*)     RBC, Urine 1-2      Mucus, Urine FEW     INFLUENZA A AND B PCR - Normal    Flu A Result Not Detected      Flu B Result Not Detected      Narrative:     This assay is an in vitro diagnostic multiplex nucleic acid amplification test for the detection and discrimination of Influenza A & B from nasopharyngeal specimens, and has been validated for use at [facility]. Negative results do not preclude Influenza A/B infections, and should not be used as the sole basis for diagnosis, treatment, or other management decisions. If Influenza A/B and RSV PCR results are negative, testing for Parainfluenza virus, Adenovirus and Metapneumovirus is routinely performed for [facility] pediatric oncology and intensive care inpatients, and is available on other patients by placing an add-on request.   SARS-COV-2 PCR - Normal    Coronavirus 2019, PCR Not Detected      Narrative:     This assay has received FDA Emergency Use Authorization (EUA) and is only authorized for the duration of time that circumstances exist to justify the authorization of the emergency use of in vitro diagnostic tests for the detection of SARS-CoV-2 virus and/or diagnosis of COVID-19  infection under section 564(b)(1) of the Act, 21 U.S.C. 360bbb-3(b)(1). This assay is an in vitro diagnostic nucleic acid amplification test for the qualitative detection of SARS-CoV-2 from nasopharyngeal specimens and has been validated for use at [facility]. Negative results do not preclude COVID-19 infections and should not be used as the sole basis for diagnosis, treatment, or other management decisions.     RSV PCR - Normal    RSV PCR Not Detected      Narrative:     This assay is an FDA-cleared, in vitro diagnostic nucleic acid amplification test for the detection of RSV from nasopharyngeal specimens, and has been validated for use at [facility]. Negative results do not preclude RSV infections, and should not be used as the sole basis for diagnosis, treatment, or other management decisions. If Influenza A/B and RSV PCR results are negative, testing for Parainfluenza virus, Adenovirus and Metapneumovirus is routinely performed for pediatric oncology and intensive care inpatients at [facility], and is available on other patients by placing an add-on request.       MAGNESIUM - Normal    Magnesium 1.90     B-TYPE NATRIURETIC PEPTIDE - Normal    BNP 30      Narrative:        <100 pg/mL - Heart failure unlikely  100-299 pg/mL - Intermediate probability of acute heart                  failure exacerbation. Correlate with clinical                  context and patient history.    >=300 pg/mL - Heart Failure likely. Correlate with clinical                  context and patient history.    BNP testing is performed using different testing methodology at [facility] than at [facility]. Direct result comparisons should only be made within the same method.      SERIAL TROPONIN-INITIAL - Normal    Troponin I, High Sensitivity 4      Narrative:     Less than 99th percentile of normal range cutoff-  Female and children under 18 years old <14 ng/L; Male <21 ng/L:  Negative  Repeat testing should be performed if clinically indicated.     Female and children under 18 years old 14-50 ng/L; Male 21-50 ng/L:  Consistent with possible cardiac damage and possible increased clinical   risk. Serial measurements may help to assess extent of myocardial damage.     >50 ng/L: Consistent with cardiac damage, increased clinical risk and  myocardial infarction. Serial measurements may help assess extent of   myocardial damage.      NOTE: Children less than 1 year old may have higher baseline troponin   levels and results should be interpreted in conjunction with the overall   clinical context.     NOTE: Troponin I testing is performed using a different   testing methodology at [facility] than at other   [facility]. Direct result comparisons should only   be made within the same method.   SERIAL TROPONIN, 1 HOUR - Normal    Troponin I, High Sensitivity <3      Narrative:     Less than 99th percentile of normal range cutoff-  Female and children under 18 years old <14 ng/L; Male <21 ng/L: Negative  Repeat testing should be performed if clinically indicated.     Female and children under 18 years old 14-50 ng/L; Male 21-50 ng/L:  Consistent with possible cardiac damage and possible increased clinical   risk. Serial measurements may help to assess extent of myocardial damage.     >50 ng/L: Consistent with cardiac damage, increased clinical risk and  myocardial infarction. Serial measurements may help assess extent of   myocardial damage.      NOTE: Children less than 1 year old may have higher baseline troponin   levels and results should be interpreted in conjunction with the overall   clinical context.     NOTE: Troponin I testing is performed using a different   testing methodology at [facility] than at other   [facility]. Direct result comparisons should only   be made within the same method.   URINE CULTURE   CBC WITH AUTO DIFFERENTIAL    WBC 7.5       nRBC 0.0      RBC 4.77      Hemoglobin 14.6      Hematocrit 44.6      MCV 94      MCH 30.6      MCHC 32.7      RDW 12.6      Platelets 197      Neutrophils % 57.7      Immature Granulocytes %, Automated 0.3      Lymphocytes % 34.0      Monocytes % 6.3      Eosinophils % 1.3      Basophils % 0.4      Neutrophils Absolute 4.30      Immature Granulocytes Absolute, Automated 0.02      Lymphocytes Absolute 2.53      Monocytes Absolute 0.47      Eosinophils Absolute 0.10      Basophils Absolute 0.03     TROPONIN SERIES- (INITIAL, 1 HR)    Narrative:     The following orders were created for panel order Troponin I Series, High Sensitivity (0, 1 HR).  Procedure                               Abnormality         Status                     ---------                               -----------         ------                     Troponin I, High Sensiti...[[id number]]  Normal              Final result               Troponin, High Sensitivi...[[id number]]  Normal              Final result                 Please view results for these tests on the individual orders.   URINALYSIS WITH REFLEX CULTURE AND MICROSCOPIC    Narrative:     The following orders were created for panel order Urinalysis with Reflex Culture and Microscopic.  Procedure                               Abnormality         Status                     ---------                               -----------         ------                     Urinalysis with Reflex C...[[id number]]  Abnormal            Final result               Extra Urine Gray Tube[[id number]]                            In process                   Please view results for these tests on the individual orders.   EXTRA URINE GRAY TUBE      CT abdomen pelvis w IV contrast   Final Result   Dilated uterine veins consistent with pelvic congestion syndrome.   5.4 cm cyst on the left kidney.   No acute process.   Signed by Arturo Howell MD      XR chest 2 views   Final Result   No acute pulmonary pathology.   Signed by  Cezar Grier MD           Considerations/further MDM:  Patient was seen in conjucntion with my supervising physician,  Dr. Parish. Please refer to his note.    Differential diagnosis includes was not limited to COPD exacerbation versus viral lower respiratory tract infection versus ACS.  In regards to ongoing pelvic discomfort we will proceed with CT abdomen pelvis with IV contrast for further evaluation.  CT abdomen pelvis with IV contrast shows no acute findings.  Dilated uterine veins were discussed with OB/GYN on-call, see ED course.  Chest x-ray shows no evidence of acute cardiopulmonary process.  Initial troponin I 4, repeat troponin I 3.  BNP is not elevated at 30.  Negative viral swabs.  Urinalysis shows evidence of mild UTI.  CBC and CMP unremarkable.  Patient was given DuoNeb x 2, oral Mylanta, IV ceftriaxone 1 g for UTI and IV Solu-Medrol.  She was discharged with instructions to take prednisone as prescribed and used nebulizer albuterol treatments as I do SPECT she has a COPD exacerbation with secondary underlying acute bronchitis.  Patient was educated on CT imaging findings and told to follow-up with her OB/GYN.  She did pass ambulatory pulse ox, she remained at 97% or above on room air.  She was given return precautions to the emergency department.  Educated follow-up with her primary care doctor.  Patient verbalized understanding and she felt comfortable with discharge plan home.  She was discharged in stable condition.    Procedure  Procedures     Flakita Penn PA-C  12/07/24 1945

## 2024-12-07 NOTE — ED TRIAGE NOTES
Pt to the ED for congestion that has  been going on for about a week. Pt sts she has a history of lung/ heart/ and kidney disease. Pt sts swelling in the face and throat pain. Pt sts a productive cough with a large amount of mucus production

## 2024-12-08 LAB
BACTERIA UR CULT: NORMAL
HOLD SPECIMEN: NORMAL

## 2024-12-09 LAB
ATRIAL RATE: 83 BPM
P AXIS: 76 DEGREES
P OFFSET: 218 MS
P ONSET: 165 MS
PR INTERVAL: 124 MS
Q ONSET: 227 MS
QRS COUNT: 13 BEATS
QRS DURATION: 88 MS
QT INTERVAL: 378 MS
QTC CALCULATION(BAZETT): 444 MS
QTC FREDERICIA: 421 MS
R AXIS: 16 DEGREES
T AXIS: 265 DEGREES
T OFFSET: 416 MS
VENTRICULAR RATE: 83 BPM

## 2025-01-15 ENCOUNTER — APPOINTMENT (OUTPATIENT)
Dept: OBSTETRICS AND GYNECOLOGY | Facility: CLINIC | Age: 60
End: 2025-01-15
Payer: COMMERCIAL

## 2025-01-21 ENCOUNTER — OFFICE VISIT (OUTPATIENT)
Dept: OBSTETRICS AND GYNECOLOGY | Facility: CLINIC | Age: 60
End: 2025-01-21
Payer: COMMERCIAL

## 2025-01-21 DIAGNOSIS — R32 URINARY INCONTINENCE, UNSPECIFIED TYPE: ICD-10-CM

## 2025-01-21 PROCEDURE — 99212 OFFICE O/P EST SF 10 MIN: CPT | Performed by: NURSE PRACTITIONER

## 2025-01-21 ASSESSMENT — ENCOUNTER SYMPTOMS
ALLERGIC/IMMUNOLOGIC NEGATIVE: 1
NEUROLOGICAL NEGATIVE: 1
GASTROINTESTINAL NEGATIVE: 1
ENDOCRINE NEGATIVE: 1
PSYCHIATRIC NEGATIVE: 1
CARDIOVASCULAR NEGATIVE: 1
RESPIRATORY NEGATIVE: 1
EYES NEGATIVE: 1
HEMATOLOGIC/LYMPHATIC NEGATIVE: 1
MUSCULOSKELETAL NEGATIVE: 1
CONSTITUTIONAL NEGATIVE: 1
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
## 2025-02-06 ENCOUNTER — HOSPITAL ENCOUNTER (EMERGENCY)
Facility: HOSPITAL | Age: 60
Discharge: HOME | End: 2025-02-06
Payer: COMMERCIAL

## 2025-02-06 ENCOUNTER — APPOINTMENT (OUTPATIENT)
Dept: RADIOLOGY | Facility: HOSPITAL | Age: 60
End: 2025-02-06
Payer: COMMERCIAL

## 2025-02-06 VITALS
HEART RATE: 72 BPM | OXYGEN SATURATION: 100 % | HEIGHT: 65 IN | DIASTOLIC BLOOD PRESSURE: 65 MMHG | RESPIRATION RATE: 18 BRPM | BODY MASS INDEX: 22.82 KG/M2 | TEMPERATURE: 97.2 F | WEIGHT: 137 LBS | SYSTOLIC BLOOD PRESSURE: 99 MMHG

## 2025-02-06 DIAGNOSIS — Z91.89 AT RISK FOR POLYPHARMACY: ICD-10-CM

## 2025-02-06 DIAGNOSIS — R51.9 NONINTRACTABLE EPISODIC HEADACHE, UNSPECIFIED HEADACHE TYPE: Primary | ICD-10-CM

## 2025-02-06 LAB
ALBUMIN SERPL BCP-MCNC: 3.8 G/DL (ref 3.4–5)
ALP SERPL-CCNC: 57 U/L (ref 33–136)
ALT SERPL W P-5'-P-CCNC: 7 U/L (ref 7–45)
ANION GAP SERPL CALCULATED.3IONS-SCNC: 9 MMOL/L (ref 10–20)
AST SERPL W P-5'-P-CCNC: 12 U/L (ref 9–39)
BASOPHILS # BLD AUTO: 0.02 X10*3/UL (ref 0–0.1)
BASOPHILS NFR BLD AUTO: 0.4 %
BILIRUB SERPL-MCNC: 0.4 MG/DL (ref 0–1.2)
BUN SERPL-MCNC: 12 MG/DL (ref 6–23)
CALCIUM SERPL-MCNC: 8.7 MG/DL (ref 8.6–10.3)
CHLORIDE SERPL-SCNC: 107 MMOL/L (ref 98–107)
CO2 SERPL-SCNC: 27 MMOL/L (ref 21–32)
CREAT SERPL-MCNC: 1.05 MG/DL (ref 0.5–1.05)
CRP SERPL-MCNC: <0.1 MG/DL
EGFRCR SERPLBLD CKD-EPI 2021: 61 ML/MIN/1.73M*2
EOSINOPHIL # BLD AUTO: 0.14 X10*3/UL (ref 0–0.7)
EOSINOPHIL NFR BLD AUTO: 2.8 %
ERYTHROCYTE [DISTWIDTH] IN BLOOD BY AUTOMATED COUNT: 12.3 % (ref 11.5–14.5)
ERYTHROCYTE [SEDIMENTATION RATE] IN BLOOD BY WESTERGREN METHOD: 8 MM/H (ref 0–30)
GLUCOSE SERPL-MCNC: 91 MG/DL (ref 74–99)
HCT VFR BLD AUTO: 41.3 % (ref 36–46)
HGB BLD-MCNC: 13.5 G/DL (ref 12–16)
IMM GRANULOCYTES # BLD AUTO: 0.01 X10*3/UL (ref 0–0.7)
IMM GRANULOCYTES NFR BLD AUTO: 0.2 % (ref 0–0.9)
LYMPHOCYTES # BLD AUTO: 2.99 X10*3/UL (ref 1.2–4.8)
LYMPHOCYTES NFR BLD AUTO: 58.9 %
MCH RBC QN AUTO: 31.1 PG (ref 26–34)
MCHC RBC AUTO-ENTMCNC: 32.7 G/DL (ref 32–36)
MCV RBC AUTO: 95 FL (ref 80–100)
MONOCYTES # BLD AUTO: 0.36 X10*3/UL (ref 0.1–1)
MONOCYTES NFR BLD AUTO: 7.1 %
NEUTROPHILS # BLD AUTO: 1.56 X10*3/UL (ref 1.2–7.7)
NEUTROPHILS NFR BLD AUTO: 30.6 %
NRBC BLD-RTO: 0 /100 WBCS (ref 0–0)
PLATELET # BLD AUTO: 179 X10*3/UL (ref 150–450)
POTASSIUM SERPL-SCNC: 4 MMOL/L (ref 3.5–5.3)
PROT SERPL-MCNC: 6 G/DL (ref 6.4–8.2)
RBC # BLD AUTO: 4.34 X10*6/UL (ref 4–5.2)
SODIUM SERPL-SCNC: 139 MMOL/L (ref 136–145)
WBC # BLD AUTO: 5.1 X10*3/UL (ref 4.4–11.3)

## 2025-02-06 PROCEDURE — 70450 CT HEAD/BRAIN W/O DYE: CPT

## 2025-02-06 PROCEDURE — 36415 COLL VENOUS BLD VENIPUNCTURE: CPT

## 2025-02-06 PROCEDURE — 80053 COMPREHEN METABOLIC PANEL: CPT

## 2025-02-06 PROCEDURE — 99284 EMERGENCY DEPT VISIT MOD MDM: CPT | Mod: 25

## 2025-02-06 PROCEDURE — 70450 CT HEAD/BRAIN W/O DYE: CPT | Performed by: STUDENT IN AN ORGANIZED HEALTH CARE EDUCATION/TRAINING PROGRAM

## 2025-02-06 PROCEDURE — 85025 COMPLETE CBC W/AUTO DIFF WBC: CPT

## 2025-02-06 PROCEDURE — 86140 C-REACTIVE PROTEIN: CPT

## 2025-02-06 PROCEDURE — 96375 TX/PRO/DX INJ NEW DRUG ADDON: CPT

## 2025-02-06 PROCEDURE — 2500000004 HC RX 250 GENERAL PHARMACY W/ HCPCS (ALT 636 FOR OP/ED)

## 2025-02-06 PROCEDURE — 85652 RBC SED RATE AUTOMATED: CPT

## 2025-02-06 PROCEDURE — 96374 THER/PROPH/DIAG INJ IV PUSH: CPT

## 2025-02-06 RX ORDER — PROCHLORPERAZINE EDISYLATE 5 MG/ML
10 INJECTION INTRAMUSCULAR; INTRAVENOUS ONCE
Status: COMPLETED | OUTPATIENT
Start: 2025-02-06 | End: 2025-02-06

## 2025-02-06 RX ORDER — KETOROLAC TROMETHAMINE 15 MG/ML
15 INJECTION, SOLUTION INTRAMUSCULAR; INTRAVENOUS ONCE
Status: COMPLETED | OUTPATIENT
Start: 2025-02-06 | End: 2025-02-06

## 2025-02-06 RX ORDER — DIPHENHYDRAMINE HYDROCHLORIDE 50 MG/ML
25 INJECTION INTRAMUSCULAR; INTRAVENOUS ONCE
Status: COMPLETED | OUTPATIENT
Start: 2025-02-06 | End: 2025-02-06

## 2025-02-06 RX ADMIN — DIPHENHYDRAMINE HYDROCHLORIDE 25 MG: 50 INJECTION, SOLUTION INTRAMUSCULAR; INTRAVENOUS at 18:07

## 2025-02-06 RX ADMIN — DEXAMETHASONE SODIUM PHOSPHATE 8 MG: 4 INJECTION, SOLUTION INTRAMUSCULAR; INTRAVENOUS at 20:12

## 2025-02-06 RX ADMIN — PROCHLORPERAZINE EDISYLATE 10 MG: 5 INJECTION INTRAMUSCULAR; INTRAVENOUS at 18:07

## 2025-02-06 RX ADMIN — KETOROLAC TROMETHAMINE 15 MG: 15 INJECTION, SOLUTION INTRAMUSCULAR; INTRAVENOUS at 18:07

## 2025-02-06 ASSESSMENT — COLUMBIA-SUICIDE SEVERITY RATING SCALE - C-SSRS
6. HAVE YOU EVER DONE ANYTHING, STARTED TO DO ANYTHING, OR PREPARED TO DO ANYTHING TO END YOUR LIFE?: NO
2. HAVE YOU ACTUALLY HAD ANY THOUGHTS OF KILLING YOURSELF?: NO
1. IN THE PAST MONTH, HAVE YOU WISHED YOU WERE DEAD OR WISHED YOU COULD GO TO SLEEP AND NOT WAKE UP?: NO

## 2025-02-06 ASSESSMENT — PAIN - FUNCTIONAL ASSESSMENT
PAIN_FUNCTIONAL_ASSESSMENT: 0-10
PAIN_FUNCTIONAL_ASSESSMENT: 0-10

## 2025-02-06 ASSESSMENT — PAIN DESCRIPTION - LOCATION
LOCATION: HEAD
LOCATION: HEAD

## 2025-02-06 ASSESSMENT — LIFESTYLE VARIABLES
TOTAL SCORE: 0
HAVE PEOPLE ANNOYED YOU BY CRITICIZING YOUR DRINKING: NO
EVER FELT BAD OR GUILTY ABOUT YOUR DRINKING: NO
EVER HAD A DRINK FIRST THING IN THE MORNING TO STEADY YOUR NERVES TO GET RID OF A HANGOVER: NO
HAVE YOU EVER FELT YOU SHOULD CUT DOWN ON YOUR DRINKING: NO

## 2025-02-06 ASSESSMENT — PAIN SCALES - GENERAL
PAINLEVEL_OUTOF10: 10 - WORST POSSIBLE PAIN
PAINLEVEL_OUTOF10: 1

## 2025-02-06 ASSESSMENT — PAIN DESCRIPTION - PAIN TYPE
TYPE: ACUTE PAIN
TYPE: ACUTE PAIN

## 2025-02-07 NOTE — ED PROVIDER NOTES
HPI   Chief Complaint   Patient presents with    Facial Pain     Patient states she was started on three new meds and everytime she takes them, he face hurts.       HPI  Patient is a 59-year-old female with remote history of migraine headaches presenting for evaluation of left-sided headache and facial pain that has been ongoing for the past 5 weeks.  Patient states that her headaches are typically associated with her medications and come on suddenly after taking her medications.  She does endorse having new medications added on within the past 5 weeks including mirtazapine, citalopram and trazodone.  She states after taking these medications she typically will have a pulsing beating headache on the left side and also associated left-sided face pain and will have to lay down for a few hours for the symptoms to resolve.  She states today even after laying down her headache had not resolved.  She states that her headache is associated with light sensitivity and sound sensitivity.  She denies any jaw claudication.  Denies fever or chills.  Denies neck pain or stiffness.  Denies vision changes.  Otherwise has no acute complaints.      Patient History   Past Medical History:   Diagnosis Date    Personal history of other diseases of the circulatory system     History of hypertension    Personal history of other diseases of the nervous system and sense organs     History of obstructive sleep apnea    Personal history of other diseases of the respiratory system     History of chronic obstructive lung disease    Personal history of other diseases of the respiratory system 06/23/2020    History of chronic obstructive lung disease    Personal history of other specified conditions     History of chest pain    Prediabetes     Pre-diabetes     Past Surgical History:   Procedure Laterality Date    OTHER SURGICAL HISTORY  04/09/2019    Cardiac catheterization    OTHER SURGICAL HISTORY  04/09/2019    Oral surgery    OTHER SURGICAL  HISTORY  2019    Surgically induced      No family history on file.  Social History     Tobacco Use    Smoking status: Every Day     Types: Cigarettes    Smokeless tobacco: Never   Vaping Use    Vaping status: Never Used   Substance Use Topics    Alcohol use: Yes    Drug use: Never       Physical Exam   ED Triage Vitals [25 1705]   Temperature Heart Rate Respirations BP   36.2 °C (97.2 °F) 72 18 99/65      Pulse Ox Temp src Heart Rate Source Patient Position   100 % -- -- --      BP Location FiO2 (%)     -- --       Physical Exam  Vitals and nursing note reviewed.   Constitutional:       General: She is not in acute distress.     Appearance: She is well-developed.   HENT:      Head: Normocephalic and atraumatic.      Comments: No pulsation of the temporal artery or tenderness to percussion of the temporal artery  Eyes:      Extraocular Movements: Extraocular movements intact.      Conjunctiva/sclera: Conjunctivae normal.      Pupils: Pupils are equal, round, and reactive to light.   Cardiovascular:      Rate and Rhythm: Normal rate and regular rhythm.      Heart sounds: No murmur heard.  Pulmonary:      Effort: Pulmonary effort is normal. No respiratory distress.      Breath sounds: Normal breath sounds.   Abdominal:      Palpations: Abdomen is soft.      Tenderness: There is no abdominal tenderness.   Musculoskeletal:         General: No swelling.      Cervical back: Neck supple.   Skin:     General: Skin is warm and dry.      Capillary Refill: Capillary refill takes less than 2 seconds.   Neurological:      Mental Status: She is alert.      Comments: Awake alert oriented to person place and time providing history for examiner, no facial droop or slurred speech, moving bilateral upper and lower extremities with 5 out of 5 strength.  Intact sensation bilaterally.   Psychiatric:         Mood and Affect: Mood normal.           ED Course & MDM   ED Course as of 25 1210   Thu 2025    1950 Did have a discussion with the neurologist on-call Dr. Latif regarding the patient case.  He did suggest obtaining inflammatory markers which I did pursue and are negative at this time.  With patient's history of migraines with migraine characteristics including light sensitivity and sound sensitivity he does not suggest further CTA at this time and feels this patient is suitable for outpatient follow-up.  [JJ]      ED Course User Index  [JJ] Carolyn Zepeda PA-C         Diagnoses as of 02/07/25 1210   Nonintractable episodic headache, unspecified headache type   At risk for polypharmacy                 No data recorded     [place] Coma Scale Score: 15 (02/06/25 1716 : Levi Jacobson LPN)                           Medical Decision Making  Parts of this chart have been completed using voice recognition software. Please excuse any errors of transcription.  My thought process and reason for plan has been formulated from the time that I saw the patient until the time of disposition and is not specific to one specific moment during their visit and furthermore my MDM encompasses this entire chart and not only this text box.      HPI: Detailed above.    Exam: A medically appropriate exam performed, outlined above, given the known history and presentation.    History obtained from: Patient    Medications given during visit:  Medications   prochlorperazine (Compazine) injection 10 mg (10 mg intravenous Given 2/6/25 1807)   diphenhydrAMINE (BENADryl) injection 25 mg (25 mg intravenous Given 2/6/25 1807)   ketorolac (Toradol) injection 15 mg (15 mg intravenous Given 2/6/25 1807)   dexAMETHasone (Decadron) injection 8 mg (8 mg intravenous Given 2/6/25 2012)        Diagnostic/tests  Labs Reviewed   COMPREHENSIVE METABOLIC PANEL - Abnormal       Result Value    Glucose 91      Sodium 139      Potassium 4.0      Chloride 107      Bicarbonate 27      Anion Gap 9 (*)     Urea Nitrogen 12      Creatinine 1.05      eGFR 61       Calcium 8.7      Albumin 3.8      Alkaline Phosphatase 57      Total Protein 6.0 (*)     AST 12      Bilirubin, Total 0.4      ALT 7     SEDIMENTATION RATE, AUTOMATED - Normal    Sedimentation Rate 8     C-REACTIVE PROTEIN - Normal    C-Reactive Protein <0.10     CBC WITH AUTO DIFFERENTIAL    WBC 5.1      nRBC 0.0      RBC 4.34      Hemoglobin 13.5      Hematocrit 41.3      MCV 95      MCH 31.1      MCHC 32.7      RDW 12.3      Platelets 179      Neutrophils % 30.6      Immature Granulocytes %, Automated 0.2      Lymphocytes % 58.9      Monocytes % 7.1      Eosinophils % 2.8      Basophils % 0.4      Neutrophils Absolute 1.56      Immature Granulocytes Absolute, Automated 0.01      Lymphocytes Absolute 2.99      Monocytes Absolute 0.36      Eosinophils Absolute 0.14      Basophils Absolute 0.02        CT head wo IV contrast   Final Result   No acute intracranial abnormality.        Partial empty sella, which can be incidental or associated idiopathic   intracranial hypertension in the appropriate clinical context.        MACRO:   None.        Signed by: Zack Valdez 2/6/2025 6:22 PM   Dictation workstation:   [id number]           Considerations/further MDM:  Patient is a 59-year-old female presenting for evaluation of headache, left-sided face pain    Patient is awake alert in no apparent distress providing history for examiner.  She is oriented to person place and time without evidence of facial droop, slurred speech or extremity weakness.  She has no focal neurologic deficits on exam.  She has no tenderness to palpation over the temporal artery.  She has no fever or jaw claudication on exam.  No neck stiffness or meningismus on exam.  Laboratory workup is without significant leukocytosis, inflammatory markers are within normal limits.  Provided migraine cocktail including Benadryl, Toradol, Compazine and Decadron for symptom relief.  CT scan was performed with evidence of a partial empty sella which can  be incidental or possibly associated with idiopathic intracranial hypertension.  I did discuss the patient case as well as the CT findings with the neurologist on-call as discussed in ED course.  Given patient's history of migraines with migraine-like presentation, he feels migraine headaches or more likely versus medication side effect and does not recommend additional imaging at this time.  He does recommend outpatient follow-up with neurology as well as thorough discussion with his primary care provider regarding polypharmacy.  I discussed the laboratory and imaging findings with the patient at bedside. Patient's questions and concerns were addressed. Patient was released in good condition, discharged with instructions to follow up with primary care provider and appropriate specialist, and to return to ED at any time for worsening symptoms or any other concerns. Patient demonstrates understanding of the findings and the importance of appropriate follow up care.   Based on the patient's history and physical exam findings there is very low clinical suspicion for significant intracranial pathology.  The headache was not maximal onset, there are no neurologic findings, there is no history of trauma, the patient does not have any jaw claudication and the patient is afebrile.  Headache is also not associated with any dizziness or ataxia.  Thus I believe pateint is suitable for outpatient symptomatic treatment and follow-up at this time.            Procedure  Procedures     Carolyn Zepeda PA-C  02/07/25 121

## 2025-02-07 NOTE — DISCHARGE INSTRUCTIONS
It is important that you follow-up with your primary care provider and your provider at [facility] for further evaluation of your medications as you may be at risk for medications causing your headaches and you may need some medication adjustments.  Please follow-up with the neurologist for further evaluation and treatment of your headaches.  Please take Tylenol and Motrin for pain relief and lie in a dark room to help with your headaches.  Present back to ER for any new onset or worsening of symptoms such as severe onset thunderclap headaches, vision changes or dizziness.    It is important to remember that your care does not end here and you must continue to monitor your condition closely. Please return to the emergency department for any worsening or concerning signs or symptoms as directed by our conversations and the discharge instructions. If you do not have a doctor please contact the referral number on your discharge instructions. Please contact any physician specialists provided in your discharge notes as it is very important to follow up with them regarding your condition. If you are unable to reach the physicians provided, please come back to the Emergency Department at any time.

## 2025-04-10 ENCOUNTER — APPOINTMENT (OUTPATIENT)
Dept: OBSTETRICS AND GYNECOLOGY | Facility: CLINIC | Age: 60
End: 2025-04-10
Payer: COMMERCIAL

## 2025-04-22 ENCOUNTER — APPOINTMENT (OUTPATIENT)
Dept: SLEEP MEDICINE | Facility: HOSPITAL | Age: 60
End: 2025-04-22
Payer: COMMERCIAL

## 2025-05-13 ENCOUNTER — TELEMEDICINE (OUTPATIENT)
Dept: SLEEP MEDICINE | Facility: HOSPITAL | Age: 60
End: 2025-05-13
Payer: COMMERCIAL

## 2025-05-13 DIAGNOSIS — G47.00 INSOMNIA: ICD-10-CM

## 2025-05-13 DIAGNOSIS — G47.30 SLEEP APNEA: ICD-10-CM

## 2025-10-07 ENCOUNTER — APPOINTMENT (OUTPATIENT)
Dept: NEUROLOGY | Facility: CLINIC | Age: 60
End: 2025-10-07
Payer: COMMERCIAL